# Patient Record
Sex: FEMALE | Race: WHITE | Employment: OTHER | URBAN - METROPOLITAN AREA
[De-identification: names, ages, dates, MRNs, and addresses within clinical notes are randomized per-mention and may not be internally consistent; named-entity substitution may affect disease eponyms.]

---

## 2023-01-13 ENCOUNTER — APPOINTMENT (OUTPATIENT)
Dept: GENERAL RADIOLOGY | Age: 83
DRG: 152 | End: 2023-01-13
Attending: HOSPITALIST
Payer: MEDICARE

## 2023-01-13 ENCOUNTER — APPOINTMENT (OUTPATIENT)
Dept: CT IMAGING | Age: 83
DRG: 152 | End: 2023-01-13
Attending: HOSPITALIST
Payer: MEDICARE

## 2023-01-13 ENCOUNTER — HOSPITAL ENCOUNTER (INPATIENT)
Age: 83
LOS: 5 days | Discharge: HOME | DRG: 152 | End: 2023-01-18
Attending: HOSPITALIST | Admitting: HOSPITALIST
Payer: MEDICARE

## 2023-01-13 DIAGNOSIS — J44.9 CHRONIC OBSTRUCTIVE PULMONARY DISEASE, UNSPECIFIED COPD TYPE (HCC): Primary | ICD-10-CM

## 2023-01-13 PROBLEM — I25.10 CORONARY ARTERY DISEASE INVOLVING NATIVE CORONARY ARTERY OF NATIVE HEART WITHOUT ANGINA PECTORIS: Status: ACTIVE | Noted: 2023-01-13

## 2023-01-13 PROBLEM — J96.01 ACUTE HYPOXEMIC RESPIRATORY FAILURE (HCC): Status: ACTIVE | Noted: 2023-01-13

## 2023-01-13 PROBLEM — J18.9 PNEUMONIA, UNSPECIFIED ORGANISM: Status: ACTIVE | Noted: 2023-01-13

## 2023-01-13 PROBLEM — I63.9 CEREBROVASCULAR ACCIDENT (CVA) (HCC): Status: ACTIVE | Noted: 2023-01-13

## 2023-01-13 LAB
ALBUMIN SERPL-MCNC: 3.2 G/DL (ref 3.4–5)
ALP BLD-CCNC: 124 U/L (ref 40–129)
ALT SERPL-CCNC: 11 U/L (ref 10–40)
ANION GAP SERPL CALCULATED.3IONS-SCNC: 10 MMOL/L (ref 3–16)
AST SERPL-CCNC: 19 U/L (ref 15–37)
BASOPHILS ABSOLUTE: 0 K/UL (ref 0–0.2)
BASOPHILS RELATIVE PERCENT: 0.1 %
BILIRUB SERPL-MCNC: 0.3 MG/DL (ref 0–1)
BILIRUBIN DIRECT: <0.2 MG/DL (ref 0–0.3)
BILIRUBIN, INDIRECT: ABNORMAL MG/DL (ref 0–1)
BUN BLDV-MCNC: 14 MG/DL (ref 7–20)
CALCIUM SERPL-MCNC: 7.9 MG/DL (ref 8.3–10.6)
CHLORIDE BLD-SCNC: 97 MMOL/L (ref 99–110)
CO2: 23 MMOL/L (ref 21–32)
CREAT SERPL-MCNC: 0.7 MG/DL (ref 0.6–1.2)
D DIMER: 1.96 UG/ML FEU (ref 0–0.6)
EKG ATRIAL RATE: 61 BPM
EKG DIAGNOSIS: NORMAL
EKG P AXIS: 27 DEGREES
EKG P-R INTERVAL: 152 MS
EKG Q-T INTERVAL: 408 MS
EKG QRS DURATION: 74 MS
EKG QTC CALCULATION (BAZETT): 410 MS
EKG R AXIS: -40 DEGREES
EKG T AXIS: 5 DEGREES
EKG VENTRICULAR RATE: 61 BPM
EOSINOPHILS ABSOLUTE: 0 K/UL (ref 0–0.6)
EOSINOPHILS RELATIVE PERCENT: 0 %
GFR SERPL CREATININE-BSD FRML MDRD: >60 ML/MIN/{1.73_M2}
GLUCOSE BLD-MCNC: 159 MG/DL (ref 70–99)
HCT VFR BLD CALC: 33.8 % (ref 36–48)
HEMOGLOBIN: 11.7 G/DL (ref 12–16)
LACTIC ACID: 1.1 MMOL/L (ref 0.4–2)
LYMPHOCYTES ABSOLUTE: 0.3 K/UL (ref 1–5.1)
LYMPHOCYTES RELATIVE PERCENT: 4.3 %
MAGNESIUM: 1.8 MG/DL (ref 1.8–2.4)
MCH RBC QN AUTO: 31.1 PG (ref 26–34)
MCHC RBC AUTO-ENTMCNC: 34.6 G/DL (ref 31–36)
MCV RBC AUTO: 89.8 FL (ref 80–100)
MONOCYTES ABSOLUTE: 0.2 K/UL (ref 0–1.3)
MONOCYTES RELATIVE PERCENT: 2.3 %
NEUTROPHILS ABSOLUTE: 6.7 K/UL (ref 1.7–7.7)
NEUTROPHILS RELATIVE PERCENT: 93.3 %
PDW BLD-RTO: 14.5 % (ref 12.4–15.4)
PLATELET # BLD: 118 K/UL (ref 135–450)
PMV BLD AUTO: 8.7 FL (ref 5–10.5)
POTASSIUM REFLEX MAGNESIUM: 3.1 MMOL/L (ref 3.5–5.1)
PROCALCITONIN: 0.12 NG/ML (ref 0–0.15)
RBC # BLD: 3.76 M/UL (ref 4–5.2)
SODIUM BLD-SCNC: 130 MMOL/L (ref 136–145)
TOTAL PROTEIN: 5.5 G/DL (ref 6.4–8.2)
WBC # BLD: 7.2 K/UL (ref 4–11)

## 2023-01-13 PROCEDURE — 83605 ASSAY OF LACTIC ACID: CPT

## 2023-01-13 PROCEDURE — 71275 CT ANGIOGRAPHY CHEST: CPT

## 2023-01-13 PROCEDURE — 93010 ELECTROCARDIOGRAM REPORT: CPT | Performed by: INTERNAL MEDICINE

## 2023-01-13 PROCEDURE — 6370000000 HC RX 637 (ALT 250 FOR IP): Performed by: HOSPITALIST

## 2023-01-13 PROCEDURE — 80048 BASIC METABOLIC PNL TOTAL CA: CPT

## 2023-01-13 PROCEDURE — 6370000000 HC RX 637 (ALT 250 FOR IP)

## 2023-01-13 PROCEDURE — 94761 N-INVAS EAR/PLS OXIMETRY MLT: CPT

## 2023-01-13 PROCEDURE — 83735 ASSAY OF MAGNESIUM: CPT

## 2023-01-13 PROCEDURE — 85379 FIBRIN DEGRADATION QUANT: CPT

## 2023-01-13 PROCEDURE — 99223 1ST HOSP IP/OBS HIGH 75: CPT | Performed by: INTERNAL MEDICINE

## 2023-01-13 PROCEDURE — 1200000000 HC SEMI PRIVATE

## 2023-01-13 PROCEDURE — 36415 COLL VENOUS BLD VENIPUNCTURE: CPT

## 2023-01-13 PROCEDURE — 85025 COMPLETE CBC W/AUTO DIFF WBC: CPT

## 2023-01-13 PROCEDURE — 94640 AIRWAY INHALATION TREATMENT: CPT

## 2023-01-13 PROCEDURE — 2580000003 HC RX 258: Performed by: HOSPITALIST

## 2023-01-13 PROCEDURE — 80076 HEPATIC FUNCTION PANEL: CPT

## 2023-01-13 PROCEDURE — 84145 PROCALCITONIN (PCT): CPT

## 2023-01-13 PROCEDURE — 2700000000 HC OXYGEN THERAPY PER DAY

## 2023-01-13 PROCEDURE — 87449 NOS EACH ORGANISM AG IA: CPT

## 2023-01-13 PROCEDURE — 71045 X-RAY EXAM CHEST 1 VIEW: CPT

## 2023-01-13 PROCEDURE — 6360000002 HC RX W HCPCS: Performed by: HOSPITALIST

## 2023-01-13 PROCEDURE — 93005 ELECTROCARDIOGRAM TRACING: CPT

## 2023-01-13 PROCEDURE — 6360000004 HC RX CONTRAST MEDICATION: Performed by: HOSPITALIST

## 2023-01-13 RX ORDER — NITROGLYCERIN 0.4 MG/1
0.4 TABLET SUBLINGUAL EVERY 5 MIN PRN
COMMUNITY

## 2023-01-13 RX ORDER — POLYETHYLENE GLYCOL 3350 17 G/17G
17 POWDER, FOR SOLUTION ORAL DAILY PRN
Status: DISCONTINUED | OUTPATIENT
Start: 2023-01-13 | End: 2023-01-18 | Stop reason: HOSPADM

## 2023-01-13 RX ORDER — OXYBUTYNIN CHLORIDE 10 MG/1
10 TABLET, EXTENDED RELEASE ORAL DAILY
COMMUNITY
Start: 2022-10-21

## 2023-01-13 RX ORDER — OSELTAMIVIR PHOSPHATE 75 MG/1
75 CAPSULE ORAL 2 TIMES DAILY
Status: DISCONTINUED | OUTPATIENT
Start: 2023-01-13 | End: 2023-01-13 | Stop reason: SDUPTHER

## 2023-01-13 RX ORDER — ALBUTEROL SULFATE 90 UG/1
2 AEROSOL, METERED RESPIRATORY (INHALATION) EVERY 4 HOURS PRN
Status: DISCONTINUED | OUTPATIENT
Start: 2023-01-13 | End: 2023-01-18 | Stop reason: HOSPADM

## 2023-01-13 RX ORDER — IPRATROPIUM BROMIDE AND ALBUTEROL SULFATE 2.5; .5 MG/3ML; MG/3ML
1 SOLUTION RESPIRATORY (INHALATION)
Status: DISCONTINUED | OUTPATIENT
Start: 2023-01-13 | End: 2023-01-13

## 2023-01-13 RX ORDER — OSELTAMIVIR PHOSPHATE 75 MG/1
75 CAPSULE ORAL 2 TIMES DAILY
Status: COMPLETED | OUTPATIENT
Start: 2023-01-13 | End: 2023-01-17

## 2023-01-13 RX ORDER — ENOXAPARIN SODIUM 100 MG/ML
40 INJECTION SUBCUTANEOUS DAILY
Status: DISCONTINUED | OUTPATIENT
Start: 2023-01-13 | End: 2023-01-18 | Stop reason: HOSPADM

## 2023-01-13 RX ORDER — ALBUTEROL SULFATE 90 UG/1
2 AEROSOL, METERED RESPIRATORY (INHALATION) EVERY 6 HOURS PRN
Status: DISCONTINUED | OUTPATIENT
Start: 2023-01-13 | End: 2023-01-13

## 2023-01-13 RX ORDER — ONDANSETRON 4 MG/1
4 TABLET, ORALLY DISINTEGRATING ORAL EVERY 8 HOURS PRN
Status: DISCONTINUED | OUTPATIENT
Start: 2023-01-13 | End: 2023-01-18 | Stop reason: HOSPADM

## 2023-01-13 RX ORDER — METOPROLOL SUCCINATE 50 MG/1
100 TABLET, EXTENDED RELEASE ORAL DAILY
Status: DISCONTINUED | OUTPATIENT
Start: 2023-01-13 | End: 2023-01-18 | Stop reason: HOSPADM

## 2023-01-13 RX ORDER — EZETIMIBE 10 MG/1
10 TABLET ORAL DAILY
Status: DISCONTINUED | OUTPATIENT
Start: 2023-01-13 | End: 2023-01-18 | Stop reason: HOSPADM

## 2023-01-13 RX ORDER — OXYBUTYNIN CHLORIDE 5 MG/1
10 TABLET, EXTENDED RELEASE ORAL DAILY
Status: DISCONTINUED | OUTPATIENT
Start: 2023-01-13 | End: 2023-01-18 | Stop reason: HOSPADM

## 2023-01-13 RX ORDER — FOLIC ACID 1 MG/1
1 TABLET ORAL DAILY
COMMUNITY

## 2023-01-13 RX ORDER — HYDROCHLOROTHIAZIDE 25 MG/1
12.5 TABLET ORAL DAILY
Status: DISCONTINUED | OUTPATIENT
Start: 2023-01-13 | End: 2023-01-18 | Stop reason: HOSPADM

## 2023-01-13 RX ORDER — UBIDECARENONE 100 MG
200 CAPSULE ORAL DAILY
Status: DISCONTINUED | OUTPATIENT
Start: 2023-01-13 | End: 2023-01-13

## 2023-01-13 RX ORDER — LEVOTHYROXINE SODIUM 0.05 MG/1
50 TABLET ORAL DAILY
Status: DISCONTINUED | OUTPATIENT
Start: 2023-01-13 | End: 2023-01-18 | Stop reason: HOSPADM

## 2023-01-13 RX ORDER — ATORVASTATIN CALCIUM 20 MG/1
20 TABLET, FILM COATED ORAL NIGHTLY
COMMUNITY
Start: 2022-10-21

## 2023-01-13 RX ORDER — CILOSTAZOL 100 MG/1
50 TABLET ORAL 2 TIMES DAILY
Status: DISCONTINUED | OUTPATIENT
Start: 2023-01-13 | End: 2023-01-18 | Stop reason: HOSPADM

## 2023-01-13 RX ORDER — LOSARTAN POTASSIUM 100 MG/1
1 TABLET ORAL DAILY
COMMUNITY
Start: 2022-10-21

## 2023-01-13 RX ORDER — POTASSIUM CHLORIDE 7.45 MG/ML
10 INJECTION INTRAVENOUS PRN
Status: DISCONTINUED | OUTPATIENT
Start: 2023-01-13 | End: 2023-01-18 | Stop reason: HOSPADM

## 2023-01-13 RX ORDER — METOPROLOL SUCCINATE 100 MG/1
100 TABLET, EXTENDED RELEASE ORAL DAILY
COMMUNITY
Start: 2022-10-21

## 2023-01-13 RX ORDER — MAGNESIUM SULFATE IN WATER 40 MG/ML
2000 INJECTION, SOLUTION INTRAVENOUS PRN
Status: DISCONTINUED | OUTPATIENT
Start: 2023-01-13 | End: 2023-01-18 | Stop reason: HOSPADM

## 2023-01-13 RX ORDER — EZETIMIBE 10 MG/1
10 TABLET ORAL DAILY
COMMUNITY
Start: 2022-10-21

## 2023-01-13 RX ORDER — ONDANSETRON 2 MG/ML
4 INJECTION INTRAMUSCULAR; INTRAVENOUS EVERY 6 HOURS PRN
Status: DISCONTINUED | OUTPATIENT
Start: 2023-01-13 | End: 2023-01-18 | Stop reason: HOSPADM

## 2023-01-13 RX ORDER — SODIUM CHLORIDE 0.9 % (FLUSH) 0.9 %
5-40 SYRINGE (ML) INJECTION PRN
Status: DISCONTINUED | OUTPATIENT
Start: 2023-01-13 | End: 2023-01-18 | Stop reason: HOSPADM

## 2023-01-13 RX ORDER — LEVOTHYROXINE SODIUM 0.05 MG/1
50 TABLET ORAL DAILY
COMMUNITY
Start: 2022-10-20

## 2023-01-13 RX ORDER — POTASSIUM CHLORIDE 20 MEQ/1
40 TABLET, EXTENDED RELEASE ORAL ONCE
Status: COMPLETED | OUTPATIENT
Start: 2023-01-13 | End: 2023-01-13

## 2023-01-13 RX ORDER — LOSARTAN POTASSIUM 100 MG/1
100 TABLET ORAL DAILY
Status: DISCONTINUED | OUTPATIENT
Start: 2023-01-13 | End: 2023-01-18 | Stop reason: HOSPADM

## 2023-01-13 RX ORDER — ACETAMINOPHEN 325 MG/1
650 TABLET ORAL EVERY 6 HOURS PRN
Status: DISCONTINUED | OUTPATIENT
Start: 2023-01-13 | End: 2023-01-18 | Stop reason: HOSPADM

## 2023-01-13 RX ORDER — FOLIC ACID 1 MG/1
1 TABLET ORAL DAILY
Status: DISCONTINUED | OUTPATIENT
Start: 2023-01-13 | End: 2023-01-18 | Stop reason: HOSPADM

## 2023-01-13 RX ORDER — ALBUTEROL SULFATE 90 UG/1
2 AEROSOL, METERED RESPIRATORY (INHALATION) EVERY 6 HOURS PRN
COMMUNITY

## 2023-01-13 RX ORDER — CILOSTAZOL 50 MG/1
50 TABLET ORAL 2 TIMES DAILY
COMMUNITY
Start: 2022-10-20

## 2023-01-13 RX ORDER — POTASSIUM CHLORIDE 20 MEQ/1
40 TABLET, EXTENDED RELEASE ORAL PRN
Status: DISCONTINUED | OUTPATIENT
Start: 2023-01-13 | End: 2023-01-18 | Stop reason: HOSPADM

## 2023-01-13 RX ORDER — ACETAMINOPHEN 650 MG/1
650 SUPPOSITORY RECTAL EVERY 6 HOURS PRN
Status: DISCONTINUED | OUTPATIENT
Start: 2023-01-13 | End: 2023-01-18 | Stop reason: HOSPADM

## 2023-01-13 RX ORDER — SODIUM CHLORIDE 9 MG/ML
INJECTION, SOLUTION INTRAVENOUS CONTINUOUS
Status: DISCONTINUED | OUTPATIENT
Start: 2023-01-13 | End: 2023-01-13

## 2023-01-13 RX ORDER — HYDROCHLOROTHIAZIDE 12.5 MG/1
12.5 TABLET ORAL DAILY
COMMUNITY
Start: 2022-10-21

## 2023-01-13 RX ORDER — UBIDECARENONE 100 MG
2 CAPSULE ORAL DAILY
COMMUNITY

## 2023-01-13 RX ORDER — SODIUM CHLORIDE 0.9 % (FLUSH) 0.9 %
5-40 SYRINGE (ML) INJECTION EVERY 12 HOURS SCHEDULED
Status: DISCONTINUED | OUTPATIENT
Start: 2023-01-13 | End: 2023-01-18 | Stop reason: HOSPADM

## 2023-01-13 RX ORDER — ATORVASTATIN CALCIUM 10 MG/1
20 TABLET, FILM COATED ORAL NIGHTLY
Status: DISCONTINUED | OUTPATIENT
Start: 2023-01-13 | End: 2023-01-18 | Stop reason: HOSPADM

## 2023-01-13 RX ORDER — SODIUM CHLORIDE 9 MG/ML
INJECTION, SOLUTION INTRAVENOUS PRN
Status: DISCONTINUED | OUTPATIENT
Start: 2023-01-13 | End: 2023-01-18 | Stop reason: HOSPADM

## 2023-01-13 RX ADMIN — LOSARTAN POTASSIUM 100 MG: 100 TABLET, FILM COATED ORAL at 09:48

## 2023-01-13 RX ADMIN — IOPAMIDOL 85 ML: 755 INJECTION, SOLUTION INTRAVENOUS at 17:54

## 2023-01-13 RX ADMIN — SODIUM CHLORIDE 25 ML: 9 INJECTION, SOLUTION INTRAVENOUS at 19:13

## 2023-01-13 RX ADMIN — LEVOTHYROXINE SODIUM 50 MCG: 50 TABLET ORAL at 09:49

## 2023-01-13 RX ADMIN — IPRATROPIUM BROMIDE AND ALBUTEROL 1 PUFF: 20; 100 SPRAY, METERED RESPIRATORY (INHALATION) at 19:59

## 2023-01-13 RX ADMIN — ENOXAPARIN SODIUM 40 MG: 100 INJECTION SUBCUTANEOUS at 09:49

## 2023-01-13 RX ADMIN — HYDROCHLOROTHIAZIDE 12.5 MG: 25 TABLET ORAL at 09:48

## 2023-01-13 RX ADMIN — CEFTRIAXONE SODIUM 1000 MG: 1 INJECTION, POWDER, FOR SOLUTION INTRAMUSCULAR; INTRAVENOUS at 20:23

## 2023-01-13 RX ADMIN — OSELTAMIVIR PHOSPHATE 75 MG: 75 CAPSULE ORAL at 12:22

## 2023-01-13 RX ADMIN — OSELTAMIVIR PHOSPHATE 75 MG: 75 CAPSULE ORAL at 03:04

## 2023-01-13 RX ADMIN — ASPIRIN 325 MG: 325 TABLET, COATED ORAL at 09:48

## 2023-01-13 RX ADMIN — FOLIC ACID 1 MG: 1 TABLET ORAL at 09:48

## 2023-01-13 RX ADMIN — SODIUM CHLORIDE: 9 INJECTION, SOLUTION INTRAVENOUS at 02:58

## 2023-01-13 RX ADMIN — CILOSTAZOL 50 MG: 100 TABLET ORAL at 20:17

## 2023-01-13 RX ADMIN — METOPROLOL SUCCINATE 100 MG: 50 TABLET, EXTENDED RELEASE ORAL at 09:49

## 2023-01-13 RX ADMIN — OSELTAMIVIR PHOSPHATE 75 MG: 75 CAPSULE ORAL at 20:17

## 2023-01-13 RX ADMIN — POTASSIUM CHLORIDE 40 MEQ: 1500 TABLET, EXTENDED RELEASE ORAL at 09:48

## 2023-01-13 RX ADMIN — EZETIMIBE 10 MG: 10 TABLET ORAL at 09:49

## 2023-01-13 RX ADMIN — CILOSTAZOL 50 MG: 100 TABLET ORAL at 13:34

## 2023-01-13 RX ADMIN — AZITHROMYCIN DIHYDRATE 500 MG: 500 INJECTION, POWDER, LYOPHILIZED, FOR SOLUTION INTRAVENOUS at 19:14

## 2023-01-13 RX ADMIN — ATORVASTATIN CALCIUM 20 MG: 10 TABLET, FILM COATED ORAL at 20:17

## 2023-01-13 RX ADMIN — OXYBUTYNIN CHLORIDE 10 MG: 5 TABLET, EXTENDED RELEASE ORAL at 09:49

## 2023-01-13 ASSESSMENT — LIFESTYLE VARIABLES
HOW MANY STANDARD DRINKS CONTAINING ALCOHOL DO YOU HAVE ON A TYPICAL DAY: PATIENT DOES NOT DRINK
HOW OFTEN DO YOU HAVE A DRINK CONTAINING ALCOHOL: NEVER

## 2023-01-13 NOTE — ACP (ADVANCE CARE PLANNING)
Advance Care Planning     General Advance Care Planning (ACP) Conversation    Date of Conversation: 1/12/2023  Conducted with: Patient with Decision Making Capacity    Healthcare Decision Maker:    Primary Decision Maker: [address] [place] [address], Legal Guardian - [phone]  Click here to complete Healthcare Decision Makers including selection of the Healthcare Decision Maker Relationship (ie \"Primary\"). Today we documented Decision Maker(s) consistent with Legal Next of Kin hierarchy. Content/Action Overview:   Has ACP document(s) NOT on file - requested patient to provide  Reviewed DNR/DNI and patient elects Full Code (Attempt Resuscitation)        Length of Voluntary ACP Conversation in minutes:  <16 minutes (Non-Billable)    Guerita Landeros RN

## 2023-01-13 NOTE — H&P
Hospital Medicine History & Physical      PCP: No primary care provider on file. Date of Admission: 1/13/2023    Date of Service: Pt seen/examined on 1/13/2023      Chief Complaint:  No chief complaint on file. History Of Present Illness: The patient is a 78 y.o. female with CAD, PVD, bilateral carotid artery stenosis, HTN, HLD, OAB and hx of CVA with residual left sided weakness who presents [facility] as transfer from [facility] ED with complaint of fall x2. Patient states that for the last 2 weeks she has experienced worsening cough and CASTANEDA. She states she has had pneumonia before and thought that's what it was. She lives alone and fell twice this morning. She states her legs became very weak and she fell to her butt. She called her daughter who took her to her PCP and PCP recommended ED evaluation. Patient has bruise to left buttock but otherwise denies injury or pain from her fall. Denies lightheadedness, dizziness, LOC and did not hit her head. She is not on AC. Denies fevers, chills, chest pain, palpitations, abd pain, n/v/c/d, numbness, tingling or swelling of her extremities. Discussed CODE STATUS with the patient who wishes to remain a FULL CODE. Past Medical History:        Diagnosis Date    CAD (coronary artery disease)     Cerebral artery occlusion with cerebral infarction ([facility]     Hyperlipidemia     Hypertension        Past Surgical History:        Procedure Laterality Date    CARDIAC SURGERY      cardiac cath    HYSTERECTOMY (CERVIX STATUS UNKNOWN)         Medications Prior to Admission:    Prior to Admission medications    Medication Sig Start Date End Date Taking? Authorizing Provider   nitroGLYCERIN (NITROSTAT) 0.4 MG SL tablet Place 0.4 mg under the tongue every 5 minutes as needed for Chest pain up to max of 3 total doses. If no relief after 1 dose, call 911.    Yes Historical Provider, MD   folic acid (FOLVITE) 1 MG tablet Take 1 mg by mouth daily   Yes Historical Provider, MD   albuterol sulfate HFA (VENTOLIN HFA) 108 (90 Base) MCG/ACT inhaler Inhale 2 puffs into the lungs every 6 hours as needed for Wheezing   Yes Historical Provider, MD   vitamin D 25 MCG (1000 UT) CAPS Take 1,000 Units by mouth Twice a Week   Yes Historical Provider, MD   aspirin 325 MG EC tablet Take 325 mg by mouth daily    Historical Provider, MD   atorvastatin (LIPITOR) 20 MG tablet Take 20 mg by mouth nightly 10/21/22   Historical Provider, MD   cilostazol (PLETAL) 50 MG tablet Take 50 mg by mouth in the morning and at bedtime 10/20/22   Historical Provider, MD   coenzyme Q10 100 MG CAPS capsule Take 2 capsules by mouth daily    Historical Provider, MD   ezetimibe (ZETIA) 10 MG tablet Take 10 mg by mouth daily 10/21/22   Historical Provider, MD   hydroCHLOROthiazide (HYDRODIURIL) 12.5 MG tablet Take 12.5 mg by mouth daily 10/21/22   Historical Provider, MD   levothyroxine (SYNTHROID) 50 MCG tablet Take 50 mcg by mouth daily 10/20/22   Historical Provider, MD   losartan (COZAAR) 100 MG tablet Take 1 tablet by mouth daily 10/21/22   Historical Provider, MD   metoprolol succinate (TOPROL XL) 100 MG extended release tablet Take 100 mg by mouth daily 10/21/22   Historical Provider, MD   oxybutynin (DITROPAN-XL) 10 MG extended release tablet Take 10 mg by mouth daily 10/21/22   Historical Provider, MD       Allergies:  Codeine    Social History:  The patient currently lives at home    TOBACCO:   reports that she has quit smoking. Her smoking use included cigarettes. She has a 1.00 pack-year smoking history. She has never used smokeless tobacco.  ETOH:   reports no history of alcohol use. Family History:   Positive as follows:    History reviewed. No pertinent family history.     REVIEW OF SYSTEMS:       Constitutional: Negative for fever +general weakness  HENT: Negative for sore throat   Eyes: Negative for redness   Respiratory: +dyspnea, +cough   Cardiovascular: Negative for chest pain   Gastrointestinal: Negative for vomiting, diarrhea   Genitourinary: Negative for hematuria   Musculoskeletal: Negative for arthralgias   Skin: Negative for rash +bruise to left buttock  Neurological: Negative for syncope   Hematological: Negative for adenopathy   Psychiatric/Behavorial: Negative for anxiety    PHYSICAL EXAM:    BP (!) 173/89   Pulse 78   Temp 97.9 °F (36.6 °C) (Oral)   Resp 18   Ht 5' 7\" (1.702 m)   Wt 157 lb (71.2 kg)   SpO2 92%   BMI 24.59 kg/m²     Gen: No distress. Alert. Eyes: PERRL. No sclera icterus. No conjunctival injection. ENT: No discharge. Pharynx clear. Neck: No JVD. No Carotid Bruit. Trachea midline. Resp: No accessory muscle use. +left sided crackles and wheezes. Bilateral lung sounds are diminished. No rhonchi. CV: Regular rate. Regular rhythm. No murmur. No rub. No edema. Capillary Refill: Brisk,< 3 seconds   Peripheral Pulses: +2 palpable, equal bilaterally   GI: Non-tender. Non-distended. No masses. No organomegaly. Normal bowel sounds. No hernia. Skin: Warm and dry. No nodule on exposed extremities. No rash on exposed extremities. +bruise to left buttock   M/S: No cyanosis. No joint deformity. No clubbing. Neuro: Awake. Grossly nonfocal    Psych: Oriented x 3. No anxiety or agitation. Lawrence Landry MD have reviewed the chart on [facility] and personally interviewed and examined patient, reviewed the data (labs and imaging) and after discussion with my PA formulated the plan. Agree with note with the following edits. HPI: 78-year-old female the history of coronary disease, fibromuscular disease, hypertension and history of CVA with residual left-sided weakness presented to [facility] emergency room with complaints of fall x2. She was subsequently transferred here. She has been having a cough with green-colored sputum along with mild shortness of breath.   Denies any fever chills  Found to have pneumonia on chest x-ray at [facility]    I reviewed the patient's Past Medical History, Past Surgical History, Medications, and Allergies. Physical exam:    BP (!) 173/89   Pulse 78   Temp 97.9 °F (36.6 °C) (Oral)   Resp 18   Ht 5' 7\" (1.702 m)   Wt 157 lb (71.2 kg)   SpO2 92%   BMI 24.59 kg/m²     Gen: No distress. Alert. Eyes: PERRL. No sclera icterus. No conjunctival injection. ENT: No discharge. Pharynx clear. Neck: Trachea midline. Normal thyroid. Resp: No accessory muscle use. No crackles. No wheezes. No rhonchi. No dullness on percussion. CV: Regular rate. Regular rhythm. No murmur or rub. No edema. GI: Non-tender. Non-distended. No masses. No organomegaly. Normal bowel sounds. No hernia. Skin: Warm and dry. No nodule on exposed extremities. No rash on exposed extremities. Lymph: No cervical LAD. No supraclavicular LAD. M/S: No cyanosis. No joint deformity. No clubbing. Neuro: Awake. Moves all 4 extremities, non focal  Psych: Oriented x 3. No anxiety or agitation.            LAURI Mora.     CBC:   Recent Labs     01/13/23  0243   WBC 7.2   HGB 11.7*   HCT 33.8*   MCV 89.8   *     BMP:   Recent Labs     01/13/23  0243   *   K 3.1*   CL 97*   CO2 23   BUN 14   CREATININE 0.7     LIVER PROFILE:   Recent Labs     01/13/23  0243   AST 19   ALT 11   BILIDIR <0.2   BILITOT 0.3   ALKPHOS 80       CULTURES  Flu reported to be positive; awaiting paperwork from [facility] ED  Resp cx pending  Strep and legionella pending    EKG:  I have reviewed the EKG with the following interpretation:   Pending    RADIOLOGY  XR CHEST PORTABLE    (Results Pending)        ASSESSMENT/PLAN:  #Acute respiratory failure with hypoxia  -no home O2 at baseline  -requiring 3.5 L at this time  -no history of asthma or COPD  Exsmoker   -dayne IBD  -wean O2 as tolerated    -Patient is reported to have pneumonia and influenza although I do not have records or imaging from ED  -check CXR-atelectasis versus pneumonia  -check procal-negative  -check resp cx, strep and legionella  -treat for gram-positive pneumonia with zithromax and rocephin   -treat influenza with tamiflu  -droplet isolation    #Fall x2   #Generalized weakness  -no acute injury other than bruise on right buttock  -EKG pending  -PTOT    #Hx of CVA with residual left sided weakness  -supportive care  -uses rollator at home and hemiwalker in public at baseline  -continue ASA and statin    #HypoK  -replace    #Thrombocytopenia  -mild  -monitor on lovenox    #CAD  #PVD  #Bilateral carotid artery stenosis  -continue pletal, ASA and statin    #HTN   -BP elevated  -continue home toprol xl, cozaar and hctz    #HLD  -continue statin and zetia    #Hypothyroidism  -continue synthroid    #OAB  -continue oxybutynin     DVT Prophylaxis: Lovenox  Diet: ADULT DIET; Regular  Code Status: Full Code    Tim King PA-C  1/13/2023 9:38 AM      Agree with above  KAREN Mora

## 2023-01-13 NOTE — PROGRESS NOTES
Transferred care to [facility]. Face to face report given, no need voiced at this time.

## 2023-01-13 NOTE — PROGRESS NOTES
Patient admitted to room 212 from [facility]. Alert and Oriented. With 4 lpm oxygen via nasal cannula. Side rails up x2. Patient has an order for telemetry. Bed is locked and in lowest position. Call light placed in patient reach. Patient explained the routine of the hospital, including but not limited to lab work, vital signs, hourly rounding, etc. Care plans and education updated, CHG wipes done at time of admission. BP (!) 160/82   Pulse 78   Temp 97.2 °F (36.2 °C) (Oral)   Resp 20   Ht 5' 7\" (1.702 m)   Wt 157 lb (71.2 kg)   SpO2 95%   BMI 24.59 kg/m²     Most recent set of vitals as shown. Patient has no  LDA that require CHG wipes, including possible a surgery incision, vazquez catheter, or a central line. 4 Eyes Skin Assessment     The patient is being assess for   Admission    I agree that 2 RN's have performed a thorough Head to Toe Skin Assessment on the patient. ALL assessment sites listed below have been assessed. Areas assessed for pressure by both nurses:   [x]   Head, Face, and Ears   [x]   Shoulders, Back, and Chest, Abdomen  [x]   Arms, Elbows, and Hands   [x]   Coccyx, Sacrum, and Ischium  [x]   Legs, Feet, and Heels    Ecchymosis noted over the left buttock. Blanchable redness over the sacrum, left heel. Scattered bruising over the bilateral upper and lower limbs. Healing laceration over the left posterior thigh. Skin Assessed Under all Medical Devices by both nurses:  O2 device tubing              All Mepilex Borders were peeled back and area peeked at by both nurses:  No:    Please list where Mepilex Borders are located:  None             **SHARE this note so that the co-signing nurse is able to place an eSignature**    Co-signer eSignature: Electronically signed by Donald Oliver RN on 1/13/23 at 4:54 AM EST    Does the Patient have Skin Breakdown related to pressure?   No              Manny Prevention initiated:  No   Wound Care Orders initiated:  No      [facility] nurse consulted for Pressure Injury (Stage 3,4, Unstageable, DTI, NWPT, Complex wounds)and New or Established Ostomies:  No      Primary Nurse eSignature: Electronically signed by Kerri Soria RN on 1/13/23 at 4:03 AM EST      Bedside Mobility Assessment Tool (BMAT):     Assessment Level 1- Sit and Shake    1. From a semi-reclined position, ask patient to sit up and rotate to a seated position at the side of the bed. Can use the bedrail. 2. Ask patient to reach out and grab your hand and shake making sure patient reaches across his/her midline. Pass- Patient is able to come to a seated position, maintain core strength. Maintains seated balance while reaching across midline. Move on to Assessment Level 2. Assessment Level 2- Stretch and Point   1. With patient in seated position at the side of the bed, have patient place both feet on the floor (or stool) with knees no higher than hips. 2. Ask patient to stretch one leg and straighten the knee, then bend the ankle/flex and point the toes. If appropriate, repeat with the other leg. Pass- Patient is able to demonstrate appropriate quad strength on intended weight bearing limb(s). Move onto Assessment Level 3. Assessment Level 3- Stand   1. Ask patient to elevate off the bed or chair (seated to standing) using an assistive device (cane, bedrail). 2. Patient should be able to raise buttocks off be and hold for a count of five. May repeat once. Fail- Patient unable to demonstrate standing stability. Patient is MOBILITY LEVEL 3. Assessment Level 4- Walk   1. Ask patient to march in place at bedside. 2. Then ask patient to advance step and return each foot. Some medical conditions may render a patient from stepping backwards, use your best clinical judgement. Fail- Patient not able to complete tasks OR requires use of assistive device. Patient is MOBILITY LEVEL 3.        Mobility Level- 2    Patient is able to demonstrate the ability to move from a reclining position to an upright position within the recliner.

## 2023-01-13 NOTE — CONSULTS
Pharmacy to contact day rounding physician in am once home med list is complete and ready for reconciliation. Med Rec status noted as completed by MELISSA Garces, at 09:16, 01/13/2023. Consult completed.   DEONDRE Valenzuela.Ph.1/13/202310:40 AM

## 2023-01-13 NOTE — PROGRESS NOTES
Patient resting in bed awake, no s/s of distress noted. No needs voiced at this time, Call light within reach.

## 2023-01-13 NOTE — PROGRESS NOTES
Pt. admitted to [facility]: Yes from [facility]    Present evaluation shows pt. is Alert and Oriented x4    Telemetry shows Normal Sinus Rhythm rate 80    VS   ED Triage Vitals [01/13/23 0200]   Enc Vitals Group      BP (!) 160/82      Heart Rate 78      Resp 20      Temp 97.2 °F (36.2 °C)      Temp Source Oral      SpO2 95 %      Weight 157 lb (71.2 kg)      Height 5' 7\" (1.702 m)      Head Circumference       Peak Flow       Pain Score       Pain Loc       Pain Edu? Excl. in [address]?          O2 at 4 lpm    Skin:   Dry and Warm    Respiration:   regular, no distress    Distress noted  no    Agree with unit placement of [address] [facility] at 1/13/2023    Electronically Signed by: Electronically signed by Jj Velasquez RN on 1/13/23 at 3:16 AM EST

## 2023-01-13 NOTE — PLAN OF CARE
Problem: Discharge Planning  Goal: Discharge to home or other facility with appropriate resources  1/13/2023 0408 by Rudy Spurling, RN  Outcome: Progressing     Problem: Safety - Adult  Goal: Free from fall injury  1/13/2023 0408 by Rudy Spurling, RN  Outcome: Progressing     Problem: Skin/Tissue Integrity  Goal: Absence of new skin breakdown  Description: 1. Monitor for areas of redness and/or skin breakdown  2. Assess vascular access sites hourly  3. Every 4-6 hours minimum:  Change oxygen saturation probe site  4. Every 4-6 hours:  If on nasal continuous positive airway pressure, respiratory therapy assess nares and determine need for appliance change or resting period.   1/13/2023 0408 by Rudy Spurling, RN  Outcome: Progressing     Problem: Pain  Goal: Verbalizes/displays adequate comfort level or baseline comfort level  Outcome: Progressing

## 2023-01-13 NOTE — PROGRESS NOTES
RT Inhaler-Nebulizer Bronchodilator Protocol Note    There is a bronchodilator order in the chart from a provider indicating to follow the RT Bronchodilator Protocol and there is an Initiate RT Inhaler-Nebulizer Bronchodilator Protocol order as well (see protocol at bottom of note). CXR Findings:  XR CHEST PORTABLE    Result Date: 1/13/2023  Mild bibasilar airspace opacities favored to represent atelectasis. The findings from the last RT Protocol Assessment were as follows:   History Pulmonary Disease: (P) Smoker 15 pack years or more  Respiratory Pattern: (P) Regular pattern and RR 12-20 bpm  Breath Sounds: (P) Intermittent or unilateral wheezes  Cough: (P) Strong, productive  Indication for Bronchodilator Therapy: (P) Wheezing associated with pulm disorder  Bronchodilator Assessment Score: (P) 6    Aerosolized bronchodilator medication orders have been revised according to the RT Inhaler-Nebulizer Bronchodilator Protocol below. Respiratory Therapist to perform RT Therapy Protocol Assessment initially then follow the protocol. Repeat RT Therapy Protocol Assessment PRN for score 0-3 or on second treatment, BID, and PRN for scores above 3. No Indications - adjust the frequency to every 6 hours PRN wheezing or bronchospasm, if no treatments needed after 48 hours then discontinue using Per Protocol order mode. If indication present, adjust the RT bronchodilator orders based on the Bronchodilator Assessment Score as indicated below. Use Inhaler orders unless patient has one or more of the following: on home nebulizer, not able to hold breath for 10 seconds, is not alert and oriented, cannot activate and use MDI correctly, or respiratory rate 25 breaths per minute or more, then use the equivalent nebulizer order(s) with same Frequency and PRN reasons based on the score. If a patient is on this medication at home then do not decrease Frequency below that used at home.     0-3 - enter or revise RT bronchodilator order(s) to equivalent RT Bronchodilator order with Frequency of every 4 hours PRN for wheezing or increased work of breathing using Per Protocol order mode. 4-6 - enter or revise RT Bronchodilator order(s) to two equivalent RT bronchodilator orders with one order with BID Frequency and one order with Frequency of every 4 hours PRN wheezing or increased work of breathing using Per Protocol order mode. 7-10 - enter or revise RT Bronchodilator order(s) to two equivalent RT bronchodilator orders with one order with TID Frequency and one order with Frequency of every 4 hours PRN wheezing or increased work of breathing using Per Protocol order mode. 11-13 - enter or revise RT Bronchodilator order(s) to one equivalent RT bronchodilator order with QID Frequency and an Albuterol order with Frequency of every 4 hours PRN wheezing or increased work of breathing using Per Protocol order mode. Greater than 13 - enter or revise RT Bronchodilator order(s) to one equivalent RT bronchodilator order with every 4 hours Frequency and an Albuterol order with Frequency of every 2 hours PRN wheezing or increased work of breathing using Per Protocol order mode.          Electronically signed by Mary Garcia RCP on 1/13/2023 at 11:19 AM

## 2023-01-13 NOTE — CARE COORDINATION
Case Management Assessment  Initial Evaluation      Patient Name: Adama Pan  YOB: 1940  Diagnosis: [facility] [J96.01]  Pneumonia, unspecified organism [J18.9]  Date / Time: 1/13/2023  1:57 AM    Admission status/Date:1/13/23 inpt  Chart Reviewed: Yes      Patient Interviewed: Yes   Family Interviewed:  No      Hospitalization in the last 30 days:  No      Health Care Decision Maker :   Primary Decision Maker: [address] [place] [address], Legal Guardian - [phone]    (CM - must 1st enter selection under Navigator - emergency contact- [place] Relationship and pick relationship)   Who do you trust or have selected to make healthcare decisions for you      Met with: patient  Interview conducted  (bedside/phone):    Current PCP: [facility] required for SNF :  Y         3 night stay required -  [employer]  Support Systems/Care Needs: Children  Transportation: self family   Meal Preparation: Self    Housing  Living Arrangements: lives 1 story home  Steps: ramp  Intent for return to present living arrangements: Yes  Identified Issues: [address] with Home Health Care : No Agency:(Services)  Type of Home Care Services: None  Passport/Waiver : No  :                      Phone Number:    Passport/Waiver Services: N/A          Durable Medical Equiptment   DME Provider:   Equipment:   Quoc Rom and rolliator___Cane___RTS___ BSC_x__Shower Chair__x_Hospital Bed___W/C____Other________  02 at ____Liter(s)---wears(frequency)_______ [facility] ___ CPAP___ BiPap___   N/A____      Home O2 Use :  No    If No for home O2---if presently on O2 during hospitalization:  No  if yes CM to follow for potential DC O2 need  Informed of need for care provider to bring portable home O2 tank on day of discharge for nursing to connect prior to leaving:   Not Indicated  Verbalized agreement/Understanding:   Not Indicated    Community Service Affiliation  Dialysis:  No    Agency:  Location:  Dialysis Schedule:  Phone:   Fax: Other Community Services: (ex:PT/OT,Mental Health,Wound Clinic, Cardio/Pul [address]) None    DISCHARGE PLAN: Explained Case Management role/services. Reviewed chart and met with pt at bedside. Role of CM explained. States lives home alone and plan return. States rainer assist and if needed at dc rainer will stay 24/7 with her. [facility] has been IPTA with all care. Discussed HHC and pt is agreeable to [facility] if needed and would like [facility]. Will cont tp follow for HHC/O2 needs.

## 2023-01-14 PROBLEM — J44.1 COPD WITH ACUTE EXACERBATION (HCC): Status: ACTIVE | Noted: 2023-01-14

## 2023-01-14 PROBLEM — E87.1 HYPONATREMIA: Status: ACTIVE | Noted: 2023-01-14

## 2023-01-14 LAB
ANION GAP SERPL CALCULATED.3IONS-SCNC: 8 MMOL/L (ref 3–16)
BASOPHILS ABSOLUTE: 0 K/UL (ref 0–0.2)
BASOPHILS RELATIVE PERCENT: 0.1 %
BUN BLDV-MCNC: 15 MG/DL (ref 7–20)
CALCIUM SERPL-MCNC: 7.9 MG/DL (ref 8.3–10.6)
CHLORIDE BLD-SCNC: 95 MMOL/L (ref 99–110)
CO2: 24 MMOL/L (ref 21–32)
CREAT SERPL-MCNC: 0.7 MG/DL (ref 0.6–1.2)
EOSINOPHILS ABSOLUTE: 0 K/UL (ref 0–0.6)
EOSINOPHILS RELATIVE PERCENT: 0 %
GFR SERPL CREATININE-BSD FRML MDRD: >60 ML/MIN/{1.73_M2}
GLUCOSE BLD-MCNC: 167 MG/DL (ref 70–99)
GLUCOSE BLD-MCNC: 99 MG/DL (ref 70–99)
HCT VFR BLD CALC: 35.1 % (ref 36–48)
HEMOGLOBIN: 11.7 G/DL (ref 12–16)
L. PNEUMOPHILA SEROGP 1 UR AG: NORMAL
LYMPHOCYTES ABSOLUTE: 1.3 K/UL (ref 1–5.1)
LYMPHOCYTES RELATIVE PERCENT: 14.2 %
MAGNESIUM: 2 MG/DL (ref 1.8–2.4)
MCH RBC QN AUTO: 30.3 PG (ref 26–34)
MCHC RBC AUTO-ENTMCNC: 33.4 G/DL (ref 31–36)
MCV RBC AUTO: 90.7 FL (ref 80–100)
MONOCYTES ABSOLUTE: 0.5 K/UL (ref 0–1.3)
MONOCYTES RELATIVE PERCENT: 5.4 %
NEUTROPHILS ABSOLUTE: 7.1 K/UL (ref 1.7–7.7)
NEUTROPHILS RELATIVE PERCENT: 80.3 %
PDW BLD-RTO: 15.4 % (ref 12.4–15.4)
PERFORMED ON: ABNORMAL
PLATELET # BLD: 128 K/UL (ref 135–450)
PMV BLD AUTO: 8.5 FL (ref 5–10.5)
POTASSIUM REFLEX MAGNESIUM: 3.4 MMOL/L (ref 3.5–5.1)
RBC # BLD: 3.87 M/UL (ref 4–5.2)
SODIUM BLD-SCNC: 127 MMOL/L (ref 136–145)
STREP PNEUMONIAE ANTIGEN, URINE: NORMAL
WBC # BLD: 8.9 K/UL (ref 4–11)

## 2023-01-14 PROCEDURE — 97116 GAIT TRAINING THERAPY: CPT

## 2023-01-14 PROCEDURE — 36415 COLL VENOUS BLD VENIPUNCTURE: CPT

## 2023-01-14 PROCEDURE — 97530 THERAPEUTIC ACTIVITIES: CPT

## 2023-01-14 PROCEDURE — 6370000000 HC RX 637 (ALT 250 FOR IP)

## 2023-01-14 PROCEDURE — 2700000000 HC OXYGEN THERAPY PER DAY

## 2023-01-14 PROCEDURE — 6370000000 HC RX 637 (ALT 250 FOR IP): Performed by: INTERNAL MEDICINE

## 2023-01-14 PROCEDURE — 2580000003 HC RX 258: Performed by: HOSPITALIST

## 2023-01-14 PROCEDURE — 6360000002 HC RX W HCPCS: Performed by: HOSPITALIST

## 2023-01-14 PROCEDURE — 80048 BASIC METABOLIC PNL TOTAL CA: CPT

## 2023-01-14 PROCEDURE — 94761 N-INVAS EAR/PLS OXIMETRY MLT: CPT

## 2023-01-14 PROCEDURE — 97535 SELF CARE MNGMENT TRAINING: CPT

## 2023-01-14 PROCEDURE — 6370000000 HC RX 637 (ALT 250 FOR IP): Performed by: HOSPITALIST

## 2023-01-14 PROCEDURE — 1200000000 HC SEMI PRIVATE

## 2023-01-14 PROCEDURE — 85025 COMPLETE CBC W/AUTO DIFF WBC: CPT

## 2023-01-14 PROCEDURE — 97162 PT EVAL MOD COMPLEX 30 MIN: CPT

## 2023-01-14 PROCEDURE — 83735 ASSAY OF MAGNESIUM: CPT

## 2023-01-14 PROCEDURE — 94640 AIRWAY INHALATION TREATMENT: CPT

## 2023-01-14 PROCEDURE — 97166 OT EVAL MOD COMPLEX 45 MIN: CPT

## 2023-01-14 PROCEDURE — 99233 SBSQ HOSP IP/OBS HIGH 50: CPT | Performed by: INTERNAL MEDICINE

## 2023-01-14 RX ORDER — IPRATROPIUM BROMIDE AND ALBUTEROL SULFATE 2.5; .5 MG/3ML; MG/3ML
1 SOLUTION RESPIRATORY (INHALATION) 3 TIMES DAILY
Status: DISCONTINUED | OUTPATIENT
Start: 2023-01-14 | End: 2023-01-18 | Stop reason: HOSPADM

## 2023-01-14 RX ORDER — PREDNISONE 20 MG/1
40 TABLET ORAL DAILY
Status: DISCONTINUED | OUTPATIENT
Start: 2023-01-14 | End: 2023-01-18 | Stop reason: HOSPADM

## 2023-01-14 RX ORDER — IPRATROPIUM BROMIDE AND ALBUTEROL SULFATE 2.5; .5 MG/3ML; MG/3ML
1 SOLUTION RESPIRATORY (INHALATION)
Status: DISCONTINUED | OUTPATIENT
Start: 2023-01-14 | End: 2023-01-14

## 2023-01-14 RX ORDER — AZITHROMYCIN 250 MG/1
250 TABLET, FILM COATED ORAL DAILY
Status: DISCONTINUED | OUTPATIENT
Start: 2023-01-14 | End: 2023-01-18

## 2023-01-14 RX ADMIN — CILOSTAZOL 50 MG: 100 TABLET ORAL at 21:06

## 2023-01-14 RX ADMIN — ATORVASTATIN CALCIUM 20 MG: 10 TABLET, FILM COATED ORAL at 20:52

## 2023-01-14 RX ADMIN — LOSARTAN POTASSIUM 100 MG: 100 TABLET, FILM COATED ORAL at 10:12

## 2023-01-14 RX ADMIN — IPRATROPIUM BROMIDE AND ALBUTEROL SULFATE 1 AMPULE: 2.5; .5 SOLUTION RESPIRATORY (INHALATION) at 19:09

## 2023-01-14 RX ADMIN — AZITHROMYCIN MONOHYDRATE 250 MG: 250 TABLET ORAL at 10:12

## 2023-01-14 RX ADMIN — ASPIRIN 325 MG: 325 TABLET, COATED ORAL at 10:12

## 2023-01-14 RX ADMIN — LEVOTHYROXINE SODIUM 50 MCG: 50 TABLET ORAL at 10:12

## 2023-01-14 RX ADMIN — OSELTAMIVIR PHOSPHATE 75 MG: 75 CAPSULE ORAL at 10:13

## 2023-01-14 RX ADMIN — OXYBUTYNIN CHLORIDE 10 MG: 5 TABLET, EXTENDED RELEASE ORAL at 10:12

## 2023-01-14 RX ADMIN — CILOSTAZOL 50 MG: 100 TABLET ORAL at 10:16

## 2023-01-14 RX ADMIN — FOLIC ACID 1 MG: 1 TABLET ORAL at 10:12

## 2023-01-14 RX ADMIN — SODIUM CHLORIDE, PRESERVATIVE FREE 10 ML: 5 INJECTION INTRAVENOUS at 20:56

## 2023-01-14 RX ADMIN — METOPROLOL SUCCINATE 100 MG: 50 TABLET, EXTENDED RELEASE ORAL at 10:12

## 2023-01-14 RX ADMIN — OSELTAMIVIR PHOSPHATE 75 MG: 75 CAPSULE ORAL at 20:54

## 2023-01-14 RX ADMIN — SODIUM CHLORIDE, PRESERVATIVE FREE 10 ML: 5 INJECTION INTRAVENOUS at 10:28

## 2023-01-14 RX ADMIN — IPRATROPIUM BROMIDE AND ALBUTEROL SULFATE 1 AMPULE: 2.5; .5 SOLUTION RESPIRATORY (INHALATION) at 11:30

## 2023-01-14 RX ADMIN — HYDROCHLOROTHIAZIDE 12.5 MG: 25 TABLET ORAL at 10:12

## 2023-01-14 RX ADMIN — PREDNISONE 40 MG: 20 TABLET ORAL at 10:12

## 2023-01-14 RX ADMIN — EZETIMIBE 10 MG: 10 TABLET ORAL at 10:12

## 2023-01-14 RX ADMIN — ENOXAPARIN SODIUM 40 MG: 100 INJECTION SUBCUTANEOUS at 10:27

## 2023-01-14 RX ADMIN — POTASSIUM CHLORIDE 40 MEQ: 1500 TABLET, EXTENDED RELEASE ORAL at 15:00

## 2023-01-14 NOTE — PROGRESS NOTES
Inpatient Physical Therapy Evaluation and Treatment    Unit: [facility]  Date:  1/14/2023  Patient Name:    Torsten Marrero  Admitting diagnosis:  Acute hypoxemic respiratory failure ([facility] [address] [J96.01]  Pneumonia, unspecified organism [J18.9]  Admit Date:  1/13/2023  Precautions/Restrictions/WB Status/ Lines/ Wounds/ Oxygen: Fall risk, Bed/chair alarm, Lines -IV, Supplemental O2 (4 L), and Purewick catheter, Telemetry, Continuous pulse oximetry, and Isolation Precautions: Droplet, L AFO and sneakers with ambulation  Watch BP    Patient cleared by RN for all activity this date  Treatment Time:  8:40-9:43  Treatment Number:  1   Timed Code Treatment Minutes: 53 minutes  Total Treatment Minutes:  63 minutes    Patient Goals for Therapy: \" none stated \"          Discharge Recommendations:  home with initail 24/7 supervision and home PT  DME needs for discharge: Needs Met       Therapy recommendation for EMS Transport: NA    Therapy recommendations for staff:   Assist of 1 with use of rolling walker (RW) and gait belt for all transfers to/from [facility]  to/from [facility]    History Of Present Illness:     per H&P:  \"The patient is a 78 y.o. female with CAD, PVD, bilateral carotid artery stenosis, HTN, HLD, OAB and hx of CVA with residual left sided weakness who presents [facility] as transfer from [facility] ED with complaint of fall x2. Patient states that for the last 2 weeks she has experienced worsening cough and CASTANEDA. She states she has had pneumonia before and thought that's what it was. She lives alone and fell twice this morning. She states her legs became very weak and she fell to her butt. She called her daughter who took her to her PCP and PCP recommended ED evaluation. Patient has bruise to left buttock but otherwise denies injury or pain from her fall. Denies lightheadedness, dizziness, LOC and did not hit her head. She is not on AC.    Denies fevers, chills, chest pain, palpitations, abd pain, n/v/c/d, numbness, tingling or swelling of her extremities. Discussed CODE STATUS with the patient who wishes to remain a FULL CODE. \"   Past Medical History:    Past Medical History []Expand by Default            Diagnosis Date    CAD (coronary artery disease)      Cerebral artery occlusion with cerebral infarction ([facility] [address]      Hyperlipidemia      Hypertension         Chest CT:  Impression:        1. No evidence of acute pulmonary embolism or acute aortic disease. No   evidence of aortic aneurysm or dissection. 2. Heart, pericardium and mediastinum appear stable. No evidence of   lymphadenopathy. 3. Moderate emphysema. Bilateral lower lobe atelectatic changes but no   consolidative infiltrates. Increased interstitial markings may indicate mild   interstitial edema. 4. Incidental cholelithiasis without inflammation of the bile duct. 5. Incidental bilateral adrenal nodules. Home Health S4 Level Recommendation:  Level 1 Standard  AM-PAC Mobility Score    AM-PAC Inpatient Mobility Raw Score : 18     Sydni Ibanez scored a 18/24 on the AM-PAC short mobility form. Current research shows that an AM-PAC score of 18 or greater is typically associated with a discharge to the patient's home setting. If patient discharges prior to next session this note will serve as a discharge summary. Please see below for the latest assessment towards goals. Preadmission Environment    Pt. Lives Alone- dtr works full time (6:30-3) and is unable to provide 24/hr assistance. Home environment:    one story home  Steps to enter first floor:   ramp            Steps to second floor: N/A  Laundry:          Main level  Bathroom:       Tub/Shower unit, Tub Transfer Bench, Raised toilet seat w/o arms, and BSC (3 in 1 commode)- by bed.   Sleeps in a:     Flat bed with hand rail  Equipment owned:      Rollator , rashaad-walker, manual W/C, lift chair, pulse ox, and reacher      If pt returns home with dtr, pt will have a level entry and be able to live on one level. Preadmission Status / PLOF:  History of falls             Yes - \"Legs just got weak and gave out\"  Pt. Able to drive          Yes  Pt Fully independent with ADL's         Yes  Pt. Required assistance from family for:  Cleaning  and shopping  Pt. Fully independent for transfers and gait and walked with: Rollator and in the community uses rashaad walker. Electric cart at L3. Pain   No    Cognition    A&O x4   Able to follow 2 step commands    Subjective  Patient lying supine in bed with  daughter present . Pt agreeable to this PT eval & tx. Upper Extremity ROM/Strength  Please see OT evaluation. Lower Extremity ROM / Strength   AROM WFL: No  ROM limitations: foot drop L foot  Strength WFL to complete bed mobility,transfers and short distance ambulation     Lower Extremity Sensation    Impaired LLE    Lower Extremity Proprioception:   WFL    Coordination and Tone  WFL    Balance  Sitting:  Normal; Independent  Comments: donned L AFO and B shoes independently with increased time to complete    Standing: Good ; SBA  Comments: using RW, no dizziness    Bed Mobility   Supine to Sit:    SBA,HOB flat, used handrail(has one at home)  Sit to Supine:   Not Tested  Rolling:   Not Tested  Scooting in sitting: Independent  Scooting in supine:  Not Tested    Transfer Training     Sit to stand:   CGA  Stand to sit:   CGA  Bed to Chair:   CGA with use of gait belt and rolling walker (RW)    Gait gait completed as indicated below  Distance:      10 ft  Deviations (firm surface/linoleum):  decreased akua, forward flexed posture, and step to pattern  Assistive Device Used:    gait belt and rolling walker (RW)  Level of Assist:    CGA  Comment: steady,no LOB,no dizziness    Stair Training deferred, pt does not have stairs in home environment  Activity Tolerance      Pt tolerated functional transfer to the chair with a decrease in BP.  RN and MD made aware, as patient has not yet received medication for BP. Pt reports no symptoms with drop in BP.        BP (mmHg)     HR (bpm) SpO2 (%)   Semifowlers before activity  164/86 71 bpm 92% on 4L   Seated EOB             147/83, a little dizzy,resolved completely  133/80- After ~ 5 minutes  134/79- after ~10 minutes 82 bpm     80 bpm 95% on 4L     97% on 4 L   Seated after activity-donning shoes, bending , prolonged time to complete 115/74,no symptoms       Post activity 120/69  94/65 - After ~ 5 minutes  113/65 - After ~10 minutes         Positioning Needs:   Reclined in chair with call light and needs in reach. Alarm Set    Other  RN and PCA aware of level of assist  RN to recheck vitals in ~15 minutes  MD aware of BP response    Patient/Family Education   Pt educated on role of inpatient PT, POC, importance of continued activity, DC recommendations, safety awareness, transfer techniques, and calling for assist with mobility. Assessment  Pt seen for Physical Therapy evaluation in acute care setting. Pt demonstrated decreased Activity tolerance and Safety as well as decreased independence with Ambulation, Bed Mobility , and Transfers. Patient will benefit from skilled PT to maximize functional mobility while in the [facility] care setting. Patient will benefit from close monitoring of vital signs, especially BP so medical staff can adjust medication. Recommending home with initial 24/7 supervision if available, and home PT. Goals : To be met in 3 visits:  1). Independent with LE Ex x 10 reps  2.) Bed to chair: Independent    To be met in 6 visits:  1). Supine to/from sit: Independent  2). Sit to/from stand: Independent  3). Bed to chair: Independent  4). Gait: Ambulate 150 ft.  with  SBA and use of rolling walker (RW)  5).   Tolerate B LE exercises 3 sets of 10-15 reps      Rehabilitation Potential: Good  Strengths for achieving goals include:   Pt motivated, PLOF, Family Support, and Pt cooperative   Barriers to achieving goals include:    Complexity of condition and Weakness    Plan    To be seen 3-5 x / week  while in acute care setting for therapeutic exercises, bed mobility, transfers, progressive gait training, balance training, and family/patient education. Signature: nAkush Fang, PT #[id number]     If patient discharges from this facility prior to next visit, this note will serve as the Discharge Summary.

## 2023-01-14 NOTE — PROGRESS NOTES
Admit: 2023    Name:  Lori Feldman  Room:  [address] [place] [address]  MRN:    [id number]    Critical Care Daily Progress Note for 2023     Interval History:     Scheduled Meds:   azithromycin  250 mg Oral Daily    predniSONE  40 mg Oral Daily    sodium chloride flush  5-40 mL IntraVENous 2 times per day    enoxaparin  40 mg SubCUTAneous Daily    aspirin  325 mg Oral Daily    atorvastatin  20 mg Oral Nightly    cilostazol  50 mg Oral BID    ezetimibe  10 mg Oral Daily    folic acid  1 mg Oral Daily    hydroCHLOROthiazide  12.5 mg Oral Daily    levothyroxine  50 mcg Oral Daily    losartan  100 mg Oral Daily    metoprolol succinate  100 mg Oral Daily    oxybutynin  10 mg Oral Daily    oseltamivir  75 mg Oral BID    albuterol-ipratropium  1 puff Inhalation 4x daily       Continuous Infusions:   sodium chloride Stopped (23)       PRN Meds:  sodium chloride flush, sodium chloride, ondansetron **OR** ondansetron, polyethylene glycol, acetaminophen **OR** acetaminophen, potassium chloride **OR** potassium alternative oral replacement **OR** potassium chloride, magnesium sulfate, albuterol sulfate HFA                  Objective:     Temp  Av.4 °F (36.3 °C)  Min: 96.8 °F (36 °C)  Max: 97.9 °F (36.6 °C)  Pulse  Av.8  Min: 65  Max: 92  BP  Min: 132/82  Max: 183/89  SpO2  Av %  Min: 92 %  Max: 94 %  Patient Vitals for the past 4 hrs:   Resp SpO2   23 0809 18 92 %         Intake/Output Summary (Last 24 hours) at 2023 6020  Last data filed at 2023  Gross per 24 hour   Intake 694.9 ml   Output 550 ml   Net 144.9 ml       Physical Exam:  General:  Awake, alert and oriented.  Appears to be not in any distress  Mucous Membranes:  Pink , anicteric  Neck: No JVD, no carotid bruit, no thyromegaly  Chest:  Clear to auscultation bilaterally, no added sounds  Cardiovascular:  RRR S1S2 heard, no murmurs or gallops  Abdomen:  Soft, undistended, non tender, no organomegaly, BS present  Extremities: No edema or cyanosis. Distal pulses well felt  Neurological : no focal deficits    Lab Data:  CBC:   Recent Labs     01/13/23 0243 01/14/23  0535   WBC 7.2 8.9   RBC 3.76* 3.87*   HGB 11.7* 11.7*   HCT 33.8* 35.1*   MCV 89.8 90.7   RDW 14.5 15.4   * 128*     BMP:   Recent Labs     01/13/23 0243 01/14/23  0535   * 127*   K 3.1* 3.4*   CL 97* 95*   CO2 23 24   BUN 14 15   CREATININE 0.7 0.7     BNP: No results for input(s): BNP in the last 72 hours. PT/INR: No results for input(s): PROTIME, INR in the last 72 hours. APTT:No results for input(s): APTT in the last 72 hours. CARDIAC ENZYMES: No results for input(s): CKMB, CKMBINDEX, TROPONINI in the last 72 hours.     Invalid input(s): CKTOTAL;3  FASTING LIPID PANEL:No results found for: CHOL, HDL, TRIG  LIVER PROFILE:   Recent Labs     01/13/23 0243   AST 19   ALT 11   BILIDIR <0.2   BILITOT 0.3   ALKPHOS 124           Assessment & Plan:     Patient Active Problem List    Diagnosis Date Noted    Acute hypoxemic respiratory failure ([facility] [address] 01/13/2023    Pneumonia, unspecified organism 01/13/2023    Cerebrovascular accident (CVA) ([facility] [address] 01/13/2023    Coronary artery disease involving native coronary artery of native heart without angina pectoris 01/13/2023     #Acute respiratory failure with hypoxia  -no home O2 at baseline  -requiring 4 L at this time  -no history of asthma or COPD  Exsmoker   -dayne IBD  -wean O2 as tolerated     Influenza  No pneumonia  -check procal-negative  -check resp cx, strep and legionella  -treat for gram-positive pneumonia with zithromax and rocephin   -treat influenza with tamiflu  -droplet isolation  CTA- no PE, no pneumonia  Change abx to azithro daily        Acute copd  Prednisone   IBD     Hyponatremia   1500 ml FR     #Fall x2   #Generalized weakness  -no acute injury other than bruise on right buttock  -PTOT     #Hx of CVA with residual left sided weakness  -supportive care  -uses rollator at home and hemiwalker in public at baseline  -continue ASA and statin     #HypoK  -replace     #Thrombocytopenia  -mild  -monitor on lovenox     #CAD  #PVD  #Bilateral carotid artery stenosis  -continue pletal, ASA and statin     #HTN   -BP elevated  -continue home toprol xl, cozaar and hctz     #HLD  -continue statin and zetia     #Hypothyroidism  -continue synthroid     #OAB  -continue oxybutynin      DVT Prophylaxis: Lovenox  Diet: ADULT DIET;  Regular  Code Status: Full Code      Mark Lucio MD

## 2023-01-14 NOTE — PROGRESS NOTES
RT Inhaler-Nebulizer Bronchodilator Protocol Note    There is a bronchodilator order in the chart from a provider indicating to follow the RT Bronchodilator Protocol and there is an Initiate RT Inhaler-Nebulizer Bronchodilator Protocol order as well (see protocol at bottom of note). CXR Findings:  XR CHEST PORTABLE    Result Date: 1/13/2023  Mild bibasilar airspace opacities favored to represent atelectasis. The findings from the last RT Protocol Assessment were as follows:   History Pulmonary Disease: Smoker 15 pack years or more  Respiratory Pattern: Regular pattern and RR 12-20 bpm  Breath Sounds: Slightly diminished and/or crackles  Cough: Strong, spontaneous, non-productive  Indication for Bronchodilator Therapy: Decreased or absent breath sounds  Bronchodilator Assessment Score: 3    Aerosolized bronchodilator medication orders have been revised according to the RT Inhaler-Nebulizer Bronchodilator Protocol below. Respiratory Therapist to perform RT Therapy Protocol Assessment initially then follow the protocol. Repeat RT Therapy Protocol Assessment PRN for score 0-3 or on second treatment, BID, and PRN for scores above 3. No Indications - adjust the frequency to every 6 hours PRN wheezing or bronchospasm, if no treatments needed after 48 hours then discontinue using Per Protocol order mode. If indication present, adjust the RT bronchodilator orders based on the Bronchodilator Assessment Score as indicated below. Use Inhaler orders unless patient has one or more of the following: on home nebulizer, not able to hold breath for 10 seconds, is not alert and oriented, cannot activate and use MDI correctly, or respiratory rate 25 breaths per minute or more, then use the equivalent nebulizer order(s) with same Frequency and PRN reasons based on the score. If a patient is on this medication at home then do not decrease Frequency below that used at home.     0-3 - enter or revise RT bronchodilator order(s) to equivalent RT Bronchodilator order with Frequency of every 4 hours PRN for wheezing or increased work of breathing using Per Protocol order mode. 4-6 - enter or revise RT Bronchodilator order(s) to two equivalent RT bronchodilator orders with one order with BID Frequency and one order with Frequency of every 4 hours PRN wheezing or increased work of breathing using Per Protocol order mode. 7-10 - enter or revise RT Bronchodilator order(s) to two equivalent RT bronchodilator orders with one order with TID Frequency and one order with Frequency of every 4 hours PRN wheezing or increased work of breathing using Per Protocol order mode. 11-13 - enter or revise RT Bronchodilator order(s) to one equivalent RT bronchodilator order with QID Frequency and an Albuterol order with Frequency of every 4 hours PRN wheezing or increased work of breathing using Per Protocol order mode. Greater than 13 - enter or revise RT Bronchodilator order(s) to one equivalent RT bronchodilator order with every 4 hours Frequency and an Albuterol order with Frequency of every 2 hours PRN wheezing or increased work of breathing using Per Protocol order mode. RT to enter RT Home Evaluation for COPD & MDI Assessment order using Per Protocol order mode.     Electronically signed by Jeferson Oliveros on 1/14/2023 at 8:11 AM

## 2023-01-14 NOTE — PROGRESS NOTES
Sleeping; aroused easily to verbal; denies chest pain, palpitations. Via tele monitor tech, patient had run of SVT; now showing NSR; Hr 80; Spo2 92% on 4 L O2 pnc. Call light in reach.

## 2023-01-14 NOTE — PROGRESS NOTES
See PM shift assessment. Alert; states she is feeling fine. Watching TV; IV abx infusing w/o diff. See vital signs. Call light in reach.

## 2023-01-14 NOTE — PLAN OF CARE
Problem: Discharge Planning  Goal: Discharge to home or other facility with appropriate resources  Outcome: Progressing     Problem: Safety - Adult  Goal: Free from fall injury  Outcome: Progressing     Problem: Skin/Tissue Integrity  Goal: Absence of new skin breakdown  Description: 1. Monitor for areas of redness and/or skin breakdown  2. Assess vascular access sites hourly  3. Every 4-6 hours minimum:  Change oxygen saturation probe site  4. Every 4-6 hours:  If on nasal continuous positive airway pressure, respiratory therapy assess nares and determine need for appliance change or resting period.   Outcome: Progressing     Problem: Pain  Goal: Verbalizes/displays adequate comfort level or baseline comfort level  Outcome: Progressing     Problem: Respiratory - Adult  Goal: Achieves optimal ventilation and oxygenation  Outcome: Progressing     Problem: Cardiovascular - Adult  Goal: Maintains optimal cardiac output and hemodynamic stability  Outcome: Progressing     Problem: Skin/Tissue Integrity - Adult  Goal: Skin integrity remains intact  Outcome: Progressing     Problem: Musculoskeletal - Adult  Goal: Return mobility to safest level of function  Outcome: Progressing

## 2023-01-14 NOTE — PROGRESS NOTES
Occupational Therapy  Inpatient Occupational Therapy  Evaluation and Treatment    Unit: [facility]  Date:  1/14/2023  Patient Name:    Elaine Quiroga  Admitting diagnosis:  Acute hypoxemic respiratory failure ([facility] [address] [J96.01]  Pneumonia, unspecified organism [J18.9]  Admit Date:  1/13/2023  Precautions/Restrictions/WB Status/ Lines/ Wounds/ Oxygen: fall risk, bed/chair alarm, purewick catheter, supplemental O2 (4L), telemetry, continuous pulse ox, and droplet precautions, AFO L foot. Watch BP    Hx of CVA residual left sided weakness. Treatment Time:  0643-6734  Treatment Number: 1     Billable Treatment Time: 50 minutes   Total Treatment Time:   60   minutes    Patient Goals for Therapy:  None stated      Discharge Recommendations: Home with PRN assist and home therapy  DME needs for discharge: Needs Met       Therapy recommendations for staff:   Assist of 1 with use of rolling walker (RW) and gait belt for all transfers to/from BSC/chair    History of Present Illness: Per H&P  \"The patient is a 78 y.o. female with CAD, PVD, bilateral carotid artery stenosis, HTN, HLD, OAB and hx of CVA with residual left sided weakness who presents [facility] as transfer from [facility] ED with complaint of fall x2. Patient states that for the last 2 weeks she has experienced worsening cough and CASTANEDA. She states she has had pneumonia before and thought that's what it was. She lives alone and fell twice this morning. She states her legs became very weak and she fell to her butt. She called her daughter who took her to her PCP and PCP recommended ED evaluation. Patient has bruise to left buttock but otherwise denies injury or pain from her fall. Denies lightheadedness, dizziness, LOC and did not hit her head. She is not on AC. Denies fevers, chills, chest pain, palpitations, abd pain, n/v/c/d, numbness, tingling or swelling of her extremities. Discussed CODE STATUS with the patient who wishes to remain a FULL CODE. \"    XR CHEST   Mild bibasilar airspace opacities favored to represent atelectasis. CTA PULMONARY W CONTRAST  1. No evidence of acute pulmonary embolism or acute aortic disease. No   evidence of aortic aneurysm or dissection. 2. Heart, pericardium and mediastinum appear stable. No evidence of   lymphadenopathy. 3. Moderate emphysema. Bilateral lower lobe atelectatic changes but no   consolidative infiltrates. Increased interstitial markings may indicate mild   interstitial edema. 4. Incidental cholelithiasis without inflammation of the bile duct. 5. Incidental bilateral adrenal nodules. Home Health S4 Level Recommendation:  Level 1 Standard  AM-PAC Score: AM-PAC Inpatient Daily Activity Raw Score: 19     Preadmission Environment    Pt. Lives Alone- dtr works full time (6:30-3) and is unable to provide 24/hr assistance. Home environment:  one story home  Steps to enter first floor:   ramp    Steps to second floor: N/A  Laundry: Main level  Bathroom:  Tub/Shower unit, Tub Transfer Bench, Raised toilet seat w/o arms, and BSC (3 in 1 commode)- by bed. Sleeps in a: Flat bed with hand rail  Equipment owned:  Rollator , rashaad-walker, manual W/C, lift chair, pulse ox, and reacher     If pt returns home with dtr, pt will have a level entry and be able to live on one level. Preadmission Status / PLOF:  History of falls   Yes - \"Legs just got weak and gave out\"  Pt. Able to drive   Yes  Pt Fully independent with ADL's  Yes  Pt. Required assistance from family for:  Cleaning  and shopping  Pt. Fully independent for transfers and gait and walked with: Rollator and in the community uses rashaad walker. Electric cart at [employer]. Subjective  Patient found in Bed with family present (dtr). Pt agreeable to this OT eval & tx. Cognition    A&O x4   Able to follow 2 step commands    Pain  No  Rating:NA  Location:NA  Pain Medicine Status: No request made        Upper Extremity ROM:    Impaired L UE.   Hx of CVA residual left sided weakness. Limited L hand ROM    Upper Extremity Strength:    RUE strength WFL, but not formally assessed w/ MMT    LUE: Weak L UE    Upper Extremity Sensation    WNL    Upper Extremity Proprioception:  Impaired LUE  RUE WNL    Coordination and Tone  Impaired LUE  RUE WNL    Balance  Static Sitting:   Independent- EOB  Dynamic Sitting:  Supervision- EOB to don AFO and Shoes. Static Standing:   SBA- with RW  Dynamic Standing:  Not Tested    Bed mobility:    Supine to sit:   SBA- with flat bed  Sit to supine:   Not Tested  Rolling:    Not Tested  Scooting in sitting:  SBA  Scooting to head of bed:   Not Tested    Bridging:   Not Tested    Transfers:    Sit to stand:  CGA  Stand to sit:  [facility]  Bed to chair:   [facility]  Standard toilet: Not Tested  Bed to [facility]:  Not Tested    Pt completed functional transfer to chair (~10 feet) with RW and gait belt. See PT note for gait analysis. Dressing:      UE:   Not Tested  LE:    SBA - to don AFO and shoes, required increased time to complete. Bathing:    UE:  Not Tested  LE:  Not Tested    Eating:   Not Tested    Toileting:  Not Tested    Grooming/hygiene: Not Tested    Activity Tolerance     Pt tolerated functional transfer to the chair with a decrease in BP. RN made aware. Pt reports no symptoms with drop in BP.       BP (mmHg)  HR (bpm) SpO2 (%)   Semifowlers before activity  164/86 71 bpm 92% on 4L   Seated EOB  147/83  133/80- After ~ 5 minutes  134/79- after ~10 minutes 82 bpm    80 bpm 95% on 4L    97% on 4 L   Seated after activity 115/74,no symptoms     Post activity 120/69  94/65 - After ~ 5 minutes  113/65 - After ~10 minutes 75 93       Positioning Needs:   Reclined in chair with call light and needs in reach. Alarm Set    Exercise / Activities Initiated:   N/A    Patient/Family Education:   Role of OT  Recommendations for DC    Assessment of Deficits: Pt seen for Occupational therapy evaluation in acute care setting.   Pt demonstrated decreased Activity tolerance, ADLs, and Transfers. Pt functioning below baseline and will likely benefit from skilled occupational therapy services to maximize safety and independence. Goal(s) : To be met in 3 Visits:  1). Bed to toilet/BSC: SBA    To be met in 5 Visits:  1). Supine to/from Sit:  Independent  2). Upper Body Bathing:   Supervision  3). Lower Body Bathing:   Supervision  4). Upper Body Dressing:  Supervision  5). Lower Body Dressing:  Supervision  6). Pt to demonstrate UE exs x 15 reps with minimal cues    Rehabilitation Potential:  Good for goals listed above. Strengths for achieving goals include: PLOF, Family Support, and Pt cooperative  Barriers to achieving goals include:  Complexity of condition     Plan: To be seen 2-3 x/wk  while in acute care setting for therapeutic exercises, bed mobility, transfers, dressing, bathing, family/patient education, ADL/IADL retraining, energy conservation training. Kirill Beck OTR/L #[id number]      If patient discharges from this facility prior to next visit, this note will serve as the Discharge Summary

## 2023-01-14 NOTE — PROGRESS NOTES
RT Inhaler-Nebulizer Bronchodilator Protocol Note    There is a bronchodilator order in the chart from a provider indicating to follow the RT Bronchodilator Protocol and there is an Initiate RT Inhaler-Nebulizer Bronchodilator Protocol order as well (see protocol at bottom of note). CXR Findings:  XR CHEST PORTABLE    Result Date: 1/13/2023  Mild bibasilar airspace opacities favored to represent atelectasis. The findings from the last RT Protocol Assessment were as follows:   History Pulmonary Disease: (P) Smoker 15 pack years or more  Respiratory Pattern: (P) Regular pattern and RR 12-20 bpm  Breath Sounds: (P) Intermittent or unilateral wheezes  Cough: (P) Strong, spontaneous, non-productive  Indication for Bronchodilator Therapy: (P) Wheezing associated with pulm disorder, On home bronchodilators  Bronchodilator Assessment Score: (P) 5    Aerosolized bronchodilator medication orders have been revised according to the RT Inhaler-Nebulizer Bronchodilator Protocol below. Respiratory Therapist to perform RT Therapy Protocol Assessment initially then follow the protocol. Repeat RT Therapy Protocol Assessment PRN for score 0-3 or on second treatment, BID, and PRN for scores above 3. No Indications - adjust the frequency to every 6 hours PRN wheezing or bronchospasm, if no treatments needed after 48 hours then discontinue using Per Protocol order mode. If indication present, adjust the RT bronchodilator orders based on the Bronchodilator Assessment Score as indicated below. Use Inhaler orders unless patient has one or more of the following: on home nebulizer, not able to hold breath for 10 seconds, is not alert and oriented, cannot activate and use MDI correctly, or respiratory rate 25 breaths per minute or more, then use the equivalent nebulizer order(s) with same Frequency and PRN reasons based on the score.   If a patient is on this medication at home then do not decrease Frequency below that used at home. 0-3 - enter or revise RT bronchodilator order(s) to equivalent RT Bronchodilator order with Frequency of every 4 hours PRN for wheezing or increased work of breathing using Per Protocol order mode. 4-6 - enter or revise RT Bronchodilator order(s) to two equivalent RT bronchodilator orders with one order with BID Frequency and one order with Frequency of every 4 hours PRN wheezing or increased work of breathing using Per Protocol order mode. 7-10 - enter or revise RT Bronchodilator order(s) to two equivalent RT bronchodilator orders with one order with TID Frequency and one order with Frequency of every 4 hours PRN wheezing or increased work of breathing using Per Protocol order mode. 11-13 - enter or revise RT Bronchodilator order(s) to one equivalent RT bronchodilator order with QID Frequency and an Albuterol order with Frequency of every 4 hours PRN wheezing or increased work of breathing using Per Protocol order mode. Greater than 13 - enter or revise RT Bronchodilator order(s) to one equivalent RT bronchodilator order with every 4 hours Frequency and an Albuterol order with Frequency of every 2 hours PRN wheezing or increased work of breathing using Per Protocol order mode.      Electronically signed by Evie Mcgee RCP on 1/13/2023 at 8:27 PM

## 2023-01-15 ENCOUNTER — APPOINTMENT (OUTPATIENT)
Dept: GENERAL RADIOLOGY | Age: 83
DRG: 152 | End: 2023-01-15
Attending: HOSPITALIST
Payer: MEDICARE

## 2023-01-15 PROBLEM — J10.1 INFLUENZA A: Status: ACTIVE | Noted: 2023-01-15

## 2023-01-15 LAB
ANION GAP SERPL CALCULATED.3IONS-SCNC: 9 MMOL/L (ref 3–16)
BUN BLDV-MCNC: 13 MG/DL (ref 7–20)
CALCIUM SERPL-MCNC: 8.1 MG/DL (ref 8.3–10.6)
CHLORIDE BLD-SCNC: 96 MMOL/L (ref 99–110)
CO2: 26 MMOL/L (ref 21–32)
CREAT SERPL-MCNC: 0.8 MG/DL (ref 0.6–1.2)
GFR SERPL CREATININE-BSD FRML MDRD: >60 ML/MIN/{1.73_M2}
GLUCOSE BLD-MCNC: 114 MG/DL (ref 70–99)
POTASSIUM REFLEX MAGNESIUM: 3.7 MMOL/L (ref 3.5–5.1)
SODIUM BLD-SCNC: 131 MMOL/L (ref 136–145)

## 2023-01-15 PROCEDURE — 80048 BASIC METABOLIC PNL TOTAL CA: CPT

## 2023-01-15 PROCEDURE — 2700000000 HC OXYGEN THERAPY PER DAY

## 2023-01-15 PROCEDURE — 94761 N-INVAS EAR/PLS OXIMETRY MLT: CPT

## 2023-01-15 PROCEDURE — 97530 THERAPEUTIC ACTIVITIES: CPT

## 2023-01-15 PROCEDURE — 36415 COLL VENOUS BLD VENIPUNCTURE: CPT

## 2023-01-15 PROCEDURE — 2580000003 HC RX 258: Performed by: HOSPITALIST

## 2023-01-15 PROCEDURE — 6370000000 HC RX 637 (ALT 250 FOR IP)

## 2023-01-15 PROCEDURE — 99223 1ST HOSP IP/OBS HIGH 75: CPT | Performed by: INTERNAL MEDICINE

## 2023-01-15 PROCEDURE — 6370000000 HC RX 637 (ALT 250 FOR IP): Performed by: HOSPITALIST

## 2023-01-15 PROCEDURE — 6360000002 HC RX W HCPCS: Performed by: HOSPITALIST

## 2023-01-15 PROCEDURE — 6370000000 HC RX 637 (ALT 250 FOR IP): Performed by: INTERNAL MEDICINE

## 2023-01-15 PROCEDURE — 1200000000 HC SEMI PRIVATE

## 2023-01-15 PROCEDURE — 94669 MECHANICAL CHEST WALL OSCILL: CPT

## 2023-01-15 PROCEDURE — 6360000002 HC RX W HCPCS: Performed by: INTERNAL MEDICINE

## 2023-01-15 PROCEDURE — 99233 SBSQ HOSP IP/OBS HIGH 50: CPT | Performed by: INTERNAL MEDICINE

## 2023-01-15 PROCEDURE — 71045 X-RAY EXAM CHEST 1 VIEW: CPT

## 2023-01-15 PROCEDURE — 94640 AIRWAY INHALATION TREATMENT: CPT

## 2023-01-15 RX ORDER — FUROSEMIDE 10 MG/ML
20 INJECTION INTRAMUSCULAR; INTRAVENOUS ONCE
Status: COMPLETED | OUTPATIENT
Start: 2023-01-15 | End: 2023-01-15

## 2023-01-15 RX ADMIN — IPRATROPIUM BROMIDE AND ALBUTEROL SULFATE 1 AMPULE: 2.5; .5 SOLUTION RESPIRATORY (INHALATION) at 07:24

## 2023-01-15 RX ADMIN — OXYBUTYNIN CHLORIDE 10 MG: 5 TABLET, EXTENDED RELEASE ORAL at 08:51

## 2023-01-15 RX ADMIN — AZITHROMYCIN MONOHYDRATE 250 MG: 250 TABLET ORAL at 08:50

## 2023-01-15 RX ADMIN — ATORVASTATIN CALCIUM 20 MG: 10 TABLET, FILM COATED ORAL at 22:28

## 2023-01-15 RX ADMIN — CILOSTAZOL 50 MG: 100 TABLET ORAL at 08:50

## 2023-01-15 RX ADMIN — OSELTAMIVIR PHOSPHATE 75 MG: 75 CAPSULE ORAL at 08:51

## 2023-01-15 RX ADMIN — IPRATROPIUM BROMIDE AND ALBUTEROL SULFATE 1 AMPULE: 2.5; .5 SOLUTION RESPIRATORY (INHALATION) at 19:06

## 2023-01-15 RX ADMIN — ASPIRIN 325 MG: 325 TABLET, COATED ORAL at 08:51

## 2023-01-15 RX ADMIN — SODIUM CHLORIDE, PRESERVATIVE FREE 10 ML: 5 INJECTION INTRAVENOUS at 08:49

## 2023-01-15 RX ADMIN — PREDNISONE 40 MG: 20 TABLET ORAL at 08:50

## 2023-01-15 RX ADMIN — FUROSEMIDE 20 MG: 10 INJECTION, SOLUTION INTRAMUSCULAR; INTRAVENOUS at 08:49

## 2023-01-15 RX ADMIN — CILOSTAZOL 50 MG: 100 TABLET ORAL at 22:28

## 2023-01-15 RX ADMIN — FOLIC ACID 1 MG: 1 TABLET ORAL at 08:50

## 2023-01-15 RX ADMIN — LEVOTHYROXINE SODIUM 50 MCG: 50 TABLET ORAL at 08:51

## 2023-01-15 RX ADMIN — OSELTAMIVIR PHOSPHATE 75 MG: 75 CAPSULE ORAL at 22:28

## 2023-01-15 RX ADMIN — EZETIMIBE 10 MG: 10 TABLET ORAL at 08:51

## 2023-01-15 RX ADMIN — ENOXAPARIN SODIUM 40 MG: 100 INJECTION SUBCUTANEOUS at 08:51

## 2023-01-15 RX ADMIN — LOSARTAN POTASSIUM 100 MG: 100 TABLET, FILM COATED ORAL at 08:51

## 2023-01-15 RX ADMIN — METOPROLOL SUCCINATE 100 MG: 50 TABLET, EXTENDED RELEASE ORAL at 08:51

## 2023-01-15 RX ADMIN — IPRATROPIUM BROMIDE AND ALBUTEROL SULFATE 1 AMPULE: 2.5; .5 SOLUTION RESPIRATORY (INHALATION) at 13:50

## 2023-01-15 NOTE — PROGRESS NOTES
Spo2 continues to fluctuate from 85% to 95% on 6 L high flow. Patient remains asymptomatic; reminding patient to cough and deep breath; voiced understanding. Hob up, laying on left side, call light in reach.

## 2023-01-15 NOTE — PROGRESS NOTES
See PM shift assessment. Pleasant and alert; states feeling better this evening. Still on 4 L O2; spo2 91%. Hob up, respirs e/e, call light in reach.

## 2023-01-15 NOTE — PROGRESS NOTES
RT Inhaler-Nebulizer Bronchodilator Protocol Note    There is a bronchodilator order in the chart from a provider indicating to follow the RT Bronchodilator Protocol and there is an Initiate RT Inhaler-Nebulizer Bronchodilator Protocol order as well (see protocol at bottom of note). CXR Findings:  XR CHEST PORTABLE    Result Date: 1/13/2023  Mild bibasilar airspace opacities favored to represent atelectasis. The findings from the last RT Protocol Assessment were as follows:   History Pulmonary Disease: Chronic pulmonary disease  Respiratory Pattern: Regular pattern and RR 12-20 bpm  Breath Sounds: Slightly diminished and/or crackles  Cough: Strong, spontaneous, non-productive  Indication for Bronchodilator Therapy: Decreased or absent breath sounds  Bronchodilator Assessment Score: 4    Aerosolized bronchodilator medication orders have been revised according to the RT Inhaler-Nebulizer Bronchodilator Protocol below. Respiratory Therapist to perform RT Therapy Protocol Assessment initially then follow the protocol. Repeat RT Therapy Protocol Assessment PRN for score 0-3 or on second treatment, BID, and PRN for scores above 3. No Indications - adjust the frequency to every 6 hours PRN wheezing or bronchospasm, if no treatments needed after 48 hours then discontinue using Per Protocol order mode. If indication present, adjust the RT bronchodilator orders based on the Bronchodilator Assessment Score as indicated below. Use Inhaler orders unless patient has one or more of the following: on home nebulizer, not able to hold breath for 10 seconds, is not alert and oriented, cannot activate and use MDI correctly, or respiratory rate 25 breaths per minute or more, then use the equivalent nebulizer order(s) with same Frequency and PRN reasons based on the score. If a patient is on this medication at home then do not decrease Frequency below that used at home.     0-3 - enter or revise RT bronchodilator order(s) to equivalent RT Bronchodilator order with Frequency of every 4 hours PRN for wheezing or increased work of breathing using Per Protocol order mode. 4-6 - enter or revise RT Bronchodilator order(s) to two equivalent RT bronchodilator orders with one order with BID Frequency and one order with Frequency of every 4 hours PRN wheezing or increased work of breathing using Per Protocol order mode. 7-10 - enter or revise RT Bronchodilator order(s) to two equivalent RT bronchodilator orders with one order with TID Frequency and one order with Frequency of every 4 hours PRN wheezing or increased work of breathing using Per Protocol order mode. 11-13 - enter or revise RT Bronchodilator order(s) to one equivalent RT bronchodilator order with QID Frequency and an Albuterol order with Frequency of every 4 hours PRN wheezing or increased work of breathing using Per Protocol order mode. Greater than 13 - enter or revise RT Bronchodilator order(s) to one equivalent RT bronchodilator order with every 4 hours Frequency and an Albuterol order with Frequency of every 2 hours PRN wheezing or increased work of breathing using Per Protocol order mode. PATIENT WILL BENEFIT FROM TREATMENTS.      Electronically signed by Orlando Moncada RCP on 1/14/2023 at 7:14 PM

## 2023-01-15 NOTE — PROGRESS NOTES
Inpatient Physical Therapy Daily Treatment Note    Unit: [address] [place]  Date:  1/15/2023  Patient Name:    Caretha Goldmann  Admitting diagnosis:  Acute hypoxemic respiratory failure ([facility] [address] [J96.01]  Pneumonia, unspecified organism [J18.9]  Admit Date:  1/13/2023  Precautions/Restrictions/WB Status/ Lines/ Wounds/ Oxygen: Fall risk, Bed/chair alarm, Lines -IV, Supplemental O2 (7 L), and Purewick catheter, Telemetry, Continuous pulse oximetry, and Isolation Precautions: Droplet, L AFO and sneakers with ambulation  Watch BP    Treatment Time: 12:07-12:45  Treatment Number:  2  Timed Code Treatment Minutes: 38 minutes  Total Treatment Minutes:  38 minutes    Patient Goals for Therapy: get up and walk, settle in chair at end of session. Discharge Recommendations:  home with initial 24/7 supervision and home PT  DME needs for discharge: Needs Met       Therapy recommendation for EMS Transport: NA    Therapy recommendations for staff:   Assist of 1 with use of rolling walker (RW) and gait belt for all transfers to/from [facility]  to/from [facility]    History Of Present Illness:     per H&P:  \"The patient is a 78 y.o. female with CAD, PVD, bilateral carotid artery stenosis, HTN, HLD, OAB and hx of CVA with residual left sided weakness who presents [facility] as transfer from [facility] ED with complaint of fall x2. Patient states that for the last 2 weeks she has experienced worsening cough and CASTANEDA. She states she has had pneumonia before and thought that's what it was. She lives alone and fell twice this morning. She states her legs became very weak and she fell to her butt. She called her daughter who took her to her PCP and PCP recommended ED evaluation. Patient has bruise to left buttock but otherwise denies injury or pain from her fall. Denies lightheadedness, dizziness, LOC and did not hit her head. She is not on AC.    Denies fevers, chills, chest pain, palpitations, abd pain, n/v/c/d, numbness, tingling or swelling of her extremities. Discussed CODE STATUS with the patient who wishes to remain a FULL CODE. \"   Past Medical History:    Past Medical History []Expand by Default            Diagnosis Date    CAD (coronary artery disease)      Cerebral artery occlusion with cerebral infarction ([facility] [address]      Hyperlipidemia      Hypertension         Chest CT:  Impression:        1. No evidence of acute pulmonary embolism or acute aortic disease. No   evidence of aortic aneurysm or dissection. 2. Heart, pericardium and mediastinum appear stable. No evidence of   lymphadenopathy. 3. Moderate emphysema. Bilateral lower lobe atelectatic changes but no   consolidative infiltrates. Increased interstitial markings may indicate mild   interstitial edema. 4. Incidental cholelithiasis without inflammation of the bile duct. 5. Incidental bilateral adrenal nodules. Home Health S4 Level Recommendation:  Level 1 Standard  AM-PAC Mobility Score    AM-PAC Inpatient Mobility Raw Score : 22       Pain   No    Cognition    A&O orientation not directly assessed. WFL   Able to follow 2 step commands    Subjective  Patient lying supine in bed with  daughter and granddaughter present . Pt agreeable to this PT tx.      Balance  Sitting:  Normal; Independent  Comments: assisted in donning of L AFO and B shoes (writer assisted, for concern of influence on BP as indicated by yesterday's therapist)    Standing: Good ; SBA  Comments: using RW, no dizziness    Bed Mobility   Supine to Sit:    Modified Independent  Sit to Supine:   Not Tested  Rolling:   Not Tested  Scooting in sitting: Independent  Scooting in supine:  Not Tested    Transfer Training     Sit to stand:   SBA  Stand to sit:   SBA  Bed to Chair:   SBA with use of gait belt and rolling walker (RW)    Gait gait completed as indicated below  Distance:      60 ft + 40 ft  Deviations (firm surface/linoleum):  decreased akua, step through pattern, decreased step length bilaterally, and compensatory hip hike to clear LLE  Assistive Device Used:    gait belt and rolling walker (RW)  Level of Assist:    CGA/SBA typcically. One instance of Min A due to LOB, pt able to reach towards bedrail to stabilize herself as well. Comment: therapist managing O2 line. Stair Training deferred, pt does not have stairs in home environment    Therapeutic Exercise  Ankle pumps x 20 reps RLE    Activity Tolerance   No complaints. Pt says she feels better than yesterday. BP (mmHg)     HR (bpm) SpO2 (%)   Semifowlers before activity  119/68 74 96% on 7L   Seated EOB             139/90 82 91-96% on 7L   Standing 121/71     After 60 ft ambulation on 7L  105 96% on 7L   After 40 ft ambulation on 6L   96% on 6L   Seated at rest - weaned to 5L   95% on 5L     Positioning Needs:   Reclined in chair with call light and needs in reach. Alarm Set    Other  RN aware of pt status and response to treatment. Left on 5L O2 at end of session. Patient/Family Education   Pt educated on role of inpatient PT, POC, importance of continued activity, DC recommendations, safety awareness, transfer techniques, and calling for assist with mobility. Assessment  Pt able to tolerate increased activity today, with stable vitals. Supplemental O2 weaned from 7L to 5L within the session. Pt functioning below baseline and would benefit from skilled physical therapy while in acute setting to address current deficits mentioned above and progress activity as tolerated. Recommending home with initial 24/7 supervision if available, and home PT. Goals : To be met in 3 visits:  1). Independent with LE Ex x 10 reps  2.) Bed to chair: Independent    To be met in 6 visits:  1). Supine to/from sit: Independent  2). Sit to/from stand: Independent  3). Bed to chair: Independent  4). Gait: Ambulate 150 ft.  with  SBA and use of rolling walker (RW)  5).   Tolerate B LE exercises 3 sets of 10-15 reps      Rehabilitation Potential: Good  Strengths for achieving goals include:   Pt motivated, PLOF, Family Support, and Pt cooperative   Barriers to achieving goals include:    Complexity of condition and Weakness    Plan    To be seen 3-5 x / week  while in acute care setting for therapeutic exercises, bed mobility, transfers, progressive gait training, balance training, and family/patient education. Signature: Julia Hawk, PT, DPT    If patient discharges from this facility prior to next visit, this note will serve as the Discharge Summary.

## 2023-01-15 NOTE — PROGRESS NOTES
Admit: 2023    Name:  Robert Vivas  Room:  [facility]0212-01  MRN:    [id number]    Daily Progress Note for 1/15/2023   Admitted with influenza A and acute hypoxic respiratory failure    Interval History:     Oxygen requirement went up to 6 L this morning. Scheduled Meds:   azithromycin  250 mg Oral Daily    predniSONE  40 mg Oral Daily    ipratropium-albuterol  1 ampule Inhalation TID    sodium chloride flush  5-40 mL IntraVENous 2 times per day    enoxaparin  40 mg SubCUTAneous Daily    aspirin  325 mg Oral Daily    atorvastatin  20 mg Oral Nightly    cilostazol  50 mg Oral BID    ezetimibe  10 mg Oral Daily    folic acid  1 mg Oral Daily    hydroCHLOROthiazide  12.5 mg Oral Daily    levothyroxine  50 mcg Oral Daily    losartan  100 mg Oral Daily    metoprolol succinate  100 mg Oral Daily    oxybutynin  10 mg Oral Daily    oseltamivir  75 mg Oral BID       Continuous Infusions:   sodium chloride Stopped (23)       PRN Meds:  sodium chloride flush, sodium chloride, ondansetron **OR** ondansetron, polyethylene glycol, acetaminophen **OR** acetaminophen, potassium chloride **OR** potassium alternative oral replacement **OR** potassium chloride, magnesium sulfate, albuterol sulfate HFA                  Objective:     Temp  Av.9 °F (36.1 °C)  Min: 96.7 °F (35.9 °C)  Max: 97.2 °F (36.2 °C)  Pulse  Av.4  Min: 63  Max: 80  BP  Min: 128/68  Max: 183/91  SpO2  Av.7 %  Min: 85 %  Max: 97 %  Patient Vitals for the past 4 hrs:   Pulse SpO2   01/15/23 0724 -- 92 %   01/15/23 0535 64 94 %           Intake/Output Summary (Last 24 hours) at 1/15/2023 5730  Last data filed at 2023 1823  Gross per 24 hour   Intake --   Output 500 ml   Net -500 ml         Physical Exam:  Gen: No distress. Alert. Eyes: PERRL. No sclera icterus. No conjunctival injection. ENT: No discharge. Pharynx clear. Neck: Trachea midline. Normal thyroid. Resp: No accessory muscle use. No crackles. No wheezes. No rhonchi. No dullness on percussion. CV: Regular rate. Regular rhythm. No murmur or rub. No edema. GI: Non-tender. Non-distended. No masses. No organomegaly. Normal bowel sounds. No hernia. Skin: Warm and dry. No nodule on exposed extremities. No rash on exposed extremities. Lymph: No cervical LAD. No supraclavicular LAD. M/S: No cyanosis. No joint deformity. No clubbing. Neuro: Awake. Moves all 4 extremities, non focal  Psych: Oriented x 3. No anxiety or agitation. Lab Data:  CBC:   Recent Labs     01/13/23 0243 01/14/23  0535   WBC 7.2 8.9   RBC 3.76* 3.87*   HGB 11.7* 11.7*   HCT 33.8* 35.1*   MCV 89.8 90.7   RDW 14.5 15.4   * 128*       BMP:   Recent Labs     01/13/23 0243 01/14/23  0535 01/15/23  0537   * 127* 131*   K 3.1* 3.4* 3.7   CL 97* 95* 96*   CO2 23 24 26   BUN 14 15 13   CREATININE 0.7 0.7 0.8       BNP: No results for input(s): BNP in the last 72 hours. PT/INR: No results for input(s): PROTIME, INR in the last 72 hours. APTT:No results for input(s): APTT in the last 72 hours. CARDIAC ENZYMES: No results for input(s): CKMB, CKMBINDEX, TROPONINI in the last 72 hours. Invalid input(s): CKTOTAL;3  FASTING LIPID PANEL:No results found for: CHOL, HDL, TRIG  LIVER PROFILE:   Recent Labs     01/13/23 0243   AST 19   ALT 11   BILIDIR <0.2   BILITOT 0.3   ALKPHOS 124           XR CHEST PORTABLE   Final Result   1. No acute abnormality. CTA PULMONARY W CONTRAST   Final Result   1. No evidence of acute pulmonary embolism or acute aortic disease. No   evidence of aortic aneurysm or dissection. 2. Heart, pericardium and mediastinum appear stable. No evidence of   lymphadenopathy. 3. Moderate emphysema. Bilateral lower lobe atelectatic changes but no   consolidative infiltrates. Increased interstitial markings may indicate mild   interstitial edema. 4. Incidental cholelithiasis without inflammation of the bile duct. 5. Incidental bilateral adrenal nodules.          XR CHEST PORTABLE   Final Result   Mild bibasilar airspace opacities favored to represent atelectasis. Assessment & Plan:     Patient Active Problem List    Diagnosis Date Noted    COPD with acute exacerbation ([facility] [address] 01/14/2023    Hyponatremia 01/14/2023    Acute hypoxemic respiratory failure ([facility] [address] 01/13/2023    Pneumonia, unspecified organism 01/13/2023    Cerebrovascular accident (CVA) ([facility] [address] 01/13/2023    Coronary artery disease involving native coronary artery of native heart without angina pectoris 01/13/2023     #Acute respiratory failure with hypoxia  -no home O2 at baseline  -requiring 4 L at this time--> 6 L   Exsmoker   -dayne IBD  -wean O2 as tolerated     Influenza  No pneumonia  -check procal-negative  -check resp cx, strep and legionella  -treat for gram-positive pneumonia with zithromax and rocephin   -treat influenza with tamiflu  -droplet isolation  CTA- no PE, no pneumonia,emphysema, mild interstitial edema  Change abx to azithro daily   Lasix 20 mg IV x 1 today  Pulm consult   Stat CXR from today- normal.     Acute copd  Prednisone   IBD     Hyponatremia   1500 ml FR -continue     #Fall x2   #Generalized weakness  -no acute injury other than bruise on right buttock  -PTOT     #Hx of CVA with residual left sided weakness  -supportive care  -uses rollator at home and hemiwalker in public at baseline  -continue ASA and statin     #HypoK  -replace     #Thrombocytopenia  -mild  -monitor on lovenox     #CAD  #PVD  #Bilateral carotid artery stenosis  -continue pletal, ASA and statin     #HTN   -BP elevated  -continue home toprol xl, cozaar and hctz     #HLD  -continue statin and zetia     #Hypothyroidism  -continue synthroid     #OAB  -continue oxybutynin      DVT Prophylaxis: Lovenox  Diet: ADULT DIET;  Regular  Code Status: Full Code      Raji Pandey MD

## 2023-01-15 NOTE — PROGRESS NOTES
RT Inhaler-Nebulizer Bronchodilator Protocol Note    There is a bronchodilator order in the chart from a provider indicating to follow the RT Bronchodilator Protocol and there is an Initiate RT Inhaler-Nebulizer Bronchodilator Protocol order as well (see protocol at bottom of note). CXR Findings:  XR CHEST PORTABLE    Result Date: 1/13/2023  Mild bibasilar airspace opacities favored to represent atelectasis. The findings from the last RT Protocol Assessment were as follows:   History Pulmonary Disease: Chronic pulmonary disease  Respiratory Pattern: Regular pattern and RR 12-20 bpm  Breath Sounds: Slightly diminished and/or crackles  Cough: Strong, spontaneous, non-productive  Indication for Bronchodilator Therapy: Decreased or absent breath sounds  Bronchodilator Assessment Score: 4    Aerosolized bronchodilator medication orders have been revised according to the RT Inhaler-Nebulizer Bronchodilator Protocol below. Respiratory Therapist to perform RT Therapy Protocol Assessment initially then follow the protocol. Repeat RT Therapy Protocol Assessment PRN for score 0-3 or on second treatment, BID, and PRN for scores above 3. No Indications - adjust the frequency to every 6 hours PRN wheezing or bronchospasm, if no treatments needed after 48 hours then discontinue using Per Protocol order mode. If indication present, adjust the RT bronchodilator orders based on the Bronchodilator Assessment Score as indicated below. Use Inhaler orders unless patient has one or more of the following: on home nebulizer, not able to hold breath for 10 seconds, is not alert and oriented, cannot activate and use MDI correctly, or respiratory rate 25 breaths per minute or more, then use the equivalent nebulizer order(s) with same Frequency and PRN reasons based on the score. If a patient is on this medication at home then do not decrease Frequency below that used at home.     0-3 - enter or revise RT bronchodilator order(s) to equivalent RT Bronchodilator order with Frequency of every 4 hours PRN for wheezing or increased work of breathing using Per Protocol order mode. 4-6 - enter or revise RT Bronchodilator order(s) to two equivalent RT bronchodilator orders with one order with BID Frequency and one order with Frequency of every 4 hours PRN wheezing or increased work of breathing using Per Protocol order mode. 7-10 - enter or revise RT Bronchodilator order(s) to two equivalent RT bronchodilator orders with one order with TID Frequency and one order with Frequency of every 4 hours PRN wheezing or increased work of breathing using Per Protocol order mode. 11-13 - enter or revise RT Bronchodilator order(s) to one equivalent RT bronchodilator order with QID Frequency and an Albuterol order with Frequency of every 4 hours PRN wheezing or increased work of breathing using Per Protocol order mode. Greater than 13 - enter or revise RT Bronchodilator order(s) to one equivalent RT bronchodilator order with every 4 hours Frequency and an Albuterol order with Frequency of every 2 hours PRN wheezing or increased work of breathing using Per Protocol order mode. RT to enter RT Home Evaluation for COPD & MDI Assessment order using Per Protocol order mode.     Electronically signed by [employer] Back on 1/15/2023 at 7:27 AM

## 2023-01-15 NOTE — CONSULTS
Reason for referral and CC: SOB, flu    HISTORY OF PRESENT ILLNESS: 79 yo female with a h/o CAD presented several days ago with c/o falling at home as well as SOB and cough. Influenza is per pt report. Her O2 has not improved so far. CT chest did not show pneumonia but did show emphysema. Past Medical History:   Diagnosis Date    CAD (coronary artery disease)     Cerebral artery occlusion with cerebral infarction ([address]     Hyperlipidemia     Hypertension      Past Surgical History:   Procedure Laterality Date    CARDIAC SURGERY      cardiac cath    HYSTERECTOMY (CERVIX STATUS UNKNOWN)       Family History  family history is not on file. Social History:  reports that she has quit smoking. Her smoking use included cigarettes. She has a 1.00 pack-year smoking history. She has never used smokeless tobacco.   reports no history of alcohol use. ALLERGIES:  Patient is allergic to codeine.   Continuous Infusions:   sodium chloride Stopped (01/13/23 1913)     Scheduled Meds:   azithromycin  250 mg Oral Daily    predniSONE  40 mg Oral Daily    ipratropium-albuterol  1 ampule Inhalation TID    sodium chloride flush  5-40 mL IntraVENous 2 times per day    enoxaparin  40 mg SubCUTAneous Daily    aspirin  325 mg Oral Daily    atorvastatin  20 mg Oral Nightly    cilostazol  50 mg Oral BID    ezetimibe  10 mg Oral Daily    folic acid  1 mg Oral Daily    [Held by provider] hydroCHLOROthiazide  12.5 mg Oral Daily    levothyroxine  50 mcg Oral Daily    losartan  100 mg Oral Daily    metoprolol succinate  100 mg Oral Daily    oxybutynin  10 mg Oral Daily    oseltamivir  75 mg Oral BID     PRN Meds:  sodium chloride flush, sodium chloride, ondansetron **OR** ondansetron, polyethylene glycol, acetaminophen **OR** acetaminophen, potassium chloride **OR** potassium alternative oral replacement **OR** potassium chloride, magnesium sulfate, albuterol sulfate HFA    REVIEW OF SYSTEMS:  Constitutional: Negative for fever  HENT: Negative for sore throat  Eyes: Negative for redness   Respiratory: +for dyspnea, cough  Cardiovascular: Negative for chest pain  Gastrointestinal: Negative for vomiting, diarrhea   Genitourinary: Negative for hematuria   Musculoskeletal: Negative for arthralgias   Skin: Negative for rash  Neurological: Negative for syncope  Hematological: Negative for adenopathy  Psychiatric/Behavorial: Negative for anxiety    PHYSICAL EXAM: /64   Pulse 79   Temp 97.2 °F (36.2 °C) (Oral)   Resp 18   Ht 5' 7\" (1.702 m)   Wt 157 lb (71.2 kg)   SpO2 96%   BMI 24.59 kg/m²  on 4L  Constitutional:  No acute distress. Eyes: PERRL. Conjunctivae anicteric. ENT: Normal nose. Normal tongue. Neck:  Trachea is midline. No thyroid tenderness. Respiratory: No accessory muscle usage. decreased breath sounds. No wheezes. No rales. No Rhonchi. Cardiovascular: Normal S1S2. No digit clubbing. No digit cyanosis. No LE edema. Capillary refill is normal.   Gastrointestinal: No mass palpated. No tenderness palpated. Skin: No rash on the exposed extremities. No Nodules or induration on exposed extremities. Psychiatric: No anxiety or Agitation. Alert and Oriented to person, place and time. CBC:   Recent Labs     01/13/23  0243 01/14/23  0535   WBC 7.2 8.9   HGB 11.7* 11.7*   HCT 33.8* 35.1*   MCV 89.8 90.7   * 128*     BMP:   Recent Labs     01/13/23  0243 01/14/23  0535 01/15/23  0537   * 127* 131*   K 3.1* 3.4* 3.7   CL 97* 95* 96*   CO2 23 24 26   BUN 14 15 13   CREATININE 0.7 0.7 0.8        Recent Labs     01/13/23  0243   AST 19   ALT 11   BILIDIR <0.2   BILITOT 0.3   ALKPHOS 124     No results for input(s): PROTIME, INR in the last 72 hours. No results for input(s): NITRITE, COLORU, PHUR, LABCAST, WBCUA, RBCUA, MUCUS, TRICHOMONAS, YEAST, BACTERIA, CLARITYU, SPECGRAV, LEUKOCYTESUR, UROBILINOGEN, BILIRUBINUR, BLOODU, GLUCOSEU, AMORPHOUS in the last 72 hours.     Invalid input(s): KETONESU  No results for input(s): PHART, EUQ0OUN, PO2ART in the last 72 hours. CTPA 1/13/23:   1. No evidence of acute pulmonary embolism or acute aortic disease. No   evidence of aortic aneurysm or dissection. 2. Heart, pericardium and mediastinum appear stable. No evidence of   lymphadenopathy. 3. Moderate emphysema. Bilateral lower lobe atelectatic changes but no   consolidative infiltrates. Increased interstitial markings may indicate mild   interstitial edema. 4. Incidental cholelithiasis without inflammation of the bile duct. 5. Incidental bilateral adrenal nodules. ASSESSMENT:  Acute hypoxic respiratory failure   Influenza A   Pulmonary emphysema noted on Chest CT    PLAN:  Supplemental oxygen to maintain SaO2 >92%; wean as tolerated  Intensive inhaled bronchodilator therapy. Trial of prednisone as her hypoxia worsened  Azithromycin  Tamiflu  Sputum GS&C.       Thank you Lennox Perrin MD for this consult

## 2023-01-15 NOTE — PROGRESS NOTES
Spo2 83%; on 5 L pnc; increased to 6 L at this time. Patient is alert; respirs e/e; denies feeling sob. Skin pink, w/d. Hob up; respiratory therapy present; placed on 6 L high flow; encouraged deep breathing and coughing; Spo2 now 94%. See vital signs.

## 2023-01-15 NOTE — PROGRESS NOTES
Shift report received from [facility]. Patient is sleeping; respirs e/e, call light in reach.

## 2023-01-15 NOTE — PLAN OF CARE
Problem: Discharge Planning  Goal: Discharge to home or other facility with appropriate resources  Outcome: Progressing     Problem: Safety - Adult  Goal: Free from fall injury  1/15/2023 1041 by Melia Eaton RN  Outcome: Progressing     Problem: Skin/Tissue Integrity  Goal: Absence of new skin breakdown  Description: 1. Monitor for areas of redness and/or skin breakdown  2. Assess vascular access sites hourly  3. Every 4-6 hours minimum:  Change oxygen saturation probe site  4. Every 4-6 hours:  If on nasal continuous positive airway pressure, respiratory therapy assess nares and determine need for appliance change or resting period.   Outcome: Progressing     Problem: Pain  Goal: Verbalizes/displays adequate comfort level or baseline comfort level  Outcome: Progressing     Problem: Respiratory - Adult  Goal: Achieves optimal ventilation and oxygenation  1/15/2023 1041 by Melia Eaton RN  Outcome: Progressing     Problem: Cardiovascular - Adult  Goal: Maintains optimal cardiac output and hemodynamic stability  Outcome: Progressing     Problem: Skin/Tissue Integrity - Adult  Goal: Skin integrity remains intact  Outcome: Progressing     Problem: Musculoskeletal - Adult  Goal: Return mobility to safest level of function  Outcome: Progressing

## 2023-01-15 NOTE — PROGRESS NOTES
Bedside report given and pt care transferred to [facility]. Pt denies needs at this time. Call light within reach.

## 2023-01-15 NOTE — PROGRESS NOTES
Consult has been called to Dr. Shante Segura on 1/15/23. Spoke with harper.  7:33 AM    Hua Cristobal  1/15/2023

## 2023-01-16 LAB
ANION GAP SERPL CALCULATED.3IONS-SCNC: 9 MMOL/L (ref 3–16)
BUN BLDV-MCNC: 15 MG/DL (ref 7–20)
CALCIUM SERPL-MCNC: 8.2 MG/DL (ref 8.3–10.6)
CHLORIDE BLD-SCNC: 95 MMOL/L (ref 99–110)
CO2: 26 MMOL/L (ref 21–32)
CREAT SERPL-MCNC: 0.8 MG/DL (ref 0.6–1.2)
GFR SERPL CREATININE-BSD FRML MDRD: >60 ML/MIN/{1.73_M2}
GLUCOSE BLD-MCNC: 100 MG/DL (ref 70–99)
POTASSIUM REFLEX MAGNESIUM: 3.7 MMOL/L (ref 3.5–5.1)
SODIUM BLD-SCNC: 130 MMOL/L (ref 136–145)

## 2023-01-16 PROCEDURE — 6370000000 HC RX 637 (ALT 250 FOR IP): Performed by: HOSPITALIST

## 2023-01-16 PROCEDURE — 94669 MECHANICAL CHEST WALL OSCILL: CPT

## 2023-01-16 PROCEDURE — 80048 BASIC METABOLIC PNL TOTAL CA: CPT

## 2023-01-16 PROCEDURE — 1200000000 HC SEMI PRIVATE

## 2023-01-16 PROCEDURE — 94640 AIRWAY INHALATION TREATMENT: CPT

## 2023-01-16 PROCEDURE — 6370000000 HC RX 637 (ALT 250 FOR IP): Performed by: INTERNAL MEDICINE

## 2023-01-16 PROCEDURE — 36415 COLL VENOUS BLD VENIPUNCTURE: CPT

## 2023-01-16 PROCEDURE — 6370000000 HC RX 637 (ALT 250 FOR IP)

## 2023-01-16 PROCEDURE — 99232 SBSQ HOSP IP/OBS MODERATE 35: CPT

## 2023-01-16 PROCEDURE — 99233 SBSQ HOSP IP/OBS HIGH 50: CPT | Performed by: INTERNAL MEDICINE

## 2023-01-16 PROCEDURE — 2580000003 HC RX 258: Performed by: HOSPITALIST

## 2023-01-16 PROCEDURE — 94761 N-INVAS EAR/PLS OXIMETRY MLT: CPT

## 2023-01-16 PROCEDURE — 2700000000 HC OXYGEN THERAPY PER DAY

## 2023-01-16 PROCEDURE — 6360000002 HC RX W HCPCS: Performed by: HOSPITALIST

## 2023-01-16 RX ADMIN — LEVOTHYROXINE SODIUM 50 MCG: 50 TABLET ORAL at 08:55

## 2023-01-16 RX ADMIN — CILOSTAZOL 50 MG: 100 TABLET ORAL at 21:54

## 2023-01-16 RX ADMIN — OXYBUTYNIN CHLORIDE 10 MG: 5 TABLET, EXTENDED RELEASE ORAL at 08:55

## 2023-01-16 RX ADMIN — CILOSTAZOL 50 MG: 100 TABLET ORAL at 08:56

## 2023-01-16 RX ADMIN — ATORVASTATIN CALCIUM 20 MG: 10 TABLET, FILM COATED ORAL at 21:54

## 2023-01-16 RX ADMIN — OSELTAMIVIR PHOSPHATE 75 MG: 75 CAPSULE ORAL at 21:54

## 2023-01-16 RX ADMIN — SODIUM CHLORIDE, PRESERVATIVE FREE 7 ML: 5 INJECTION INTRAVENOUS at 21:56

## 2023-01-16 RX ADMIN — OSELTAMIVIR PHOSPHATE 75 MG: 75 CAPSULE ORAL at 08:55

## 2023-01-16 RX ADMIN — FOLIC ACID 1 MG: 1 TABLET ORAL at 08:55

## 2023-01-16 RX ADMIN — ENOXAPARIN SODIUM 40 MG: 100 INJECTION SUBCUTANEOUS at 08:55

## 2023-01-16 RX ADMIN — PREDNISONE 40 MG: 20 TABLET ORAL at 08:55

## 2023-01-16 RX ADMIN — METOPROLOL SUCCINATE 100 MG: 50 TABLET, EXTENDED RELEASE ORAL at 08:55

## 2023-01-16 RX ADMIN — LOSARTAN POTASSIUM 100 MG: 100 TABLET, FILM COATED ORAL at 08:55

## 2023-01-16 RX ADMIN — IPRATROPIUM BROMIDE AND ALBUTEROL SULFATE 1 AMPULE: 2.5; .5 SOLUTION RESPIRATORY (INHALATION) at 19:18

## 2023-01-16 RX ADMIN — IPRATROPIUM BROMIDE AND ALBUTEROL SULFATE 1 AMPULE: 2.5; .5 SOLUTION RESPIRATORY (INHALATION) at 13:46

## 2023-01-16 RX ADMIN — SODIUM CHLORIDE, PRESERVATIVE FREE 10 ML: 5 INJECTION INTRAVENOUS at 08:56

## 2023-01-16 RX ADMIN — ASPIRIN 325 MG: 325 TABLET, COATED ORAL at 08:55

## 2023-01-16 RX ADMIN — AZITHROMYCIN MONOHYDRATE 250 MG: 250 TABLET ORAL at 08:55

## 2023-01-16 RX ADMIN — EZETIMIBE 10 MG: 10 TABLET ORAL at 08:55

## 2023-01-16 NOTE — PROGRESS NOTES
Progress Note    Admit Date:  1/13/2023    Admitted with influenza A and acute hypoxic respiratory failure    Subjective:  Ms. Tapan Servin reports feeling much better today. Cough and SOB have improved. She is seen on 3L O2. Objective:   Patient Vitals for the past 4 hrs:   BP Temp Temp src Pulse Resp SpO2   01/16/23 1445 131/74 99 °F (37.2 °C) Oral 74 16 92 %   01/16/23 1346 -- -- -- -- -- 91 %   01/16/23 1230 -- -- -- 98 -- 100 %   01/16/23 1215 -- -- -- -- -- 98 %          Intake/Output Summary (Last 24 hours) at 1/16/2023 1517  Last data filed at 1/16/2023 0830  Gross per 24 hour   Intake 360 ml   Output 400 ml   Net -40 ml       Physical Exam:  Gen: No distress. Alert. Eyes: PERRL. No sclera icterus. No conjunctival injection. Neck: No JVD. Trachea midline. Resp: No accessory muscle use. No crackles. No wheezes. + rhonchi. CV: Regular rate. Regular rhythm. No murmur. No rub. No edema. Peripheral Pulses: +2 palpable, equal bilaterally   GI: Non-tender. Non-distended. Normal bowel sounds. Skin: Warm and dry. No nodule on exposed extremities. No rash on exposed extremities. M/S: No cyanosis. No joint deformity. No clubbing. Neuro: Awake. Grossly nonfocal    Psych: Oriented x 3. No anxiety or agitation.        Scheduled Meds:   azithromycin  250 mg Oral Daily    predniSONE  40 mg Oral Daily    ipratropium-albuterol  1 ampule Inhalation TID    sodium chloride flush  5-40 mL IntraVENous 2 times per day    enoxaparin  40 mg SubCUTAneous Daily    aspirin  325 mg Oral Daily    atorvastatin  20 mg Oral Nightly    cilostazol  50 mg Oral BID    ezetimibe  10 mg Oral Daily    folic acid  1 mg Oral Daily    [Held by provider] hydroCHLOROthiazide  12.5 mg Oral Daily    levothyroxine  50 mcg Oral Daily    losartan  100 mg Oral Daily    metoprolol succinate  100 mg Oral Daily    oxybutynin  10 mg Oral Daily    oseltamivir  75 mg Oral BID       Continuous Infusions:   sodium chloride Stopped (01/13/23 1913) PRN Meds:  sodium chloride flush, sodium chloride, ondansetron **OR** ondansetron, polyethylene glycol, acetaminophen **OR** acetaminophen, potassium chloride **OR** potassium alternative oral replacement **OR** potassium chloride, magnesium sulfate, albuterol sulfate HFA      Data:  CBC:   Recent Labs     01/14/23  0535   WBC 8.9   HGB 11.7*   HCT 35.1*   MCV 90.7   *     BMP:   Recent Labs     01/14/23  0535 01/15/23  0537 01/16/23  0529   * 131* 130*   K 3.4* 3.7 3.7   CL 95* 96* 95*   CO2 24 26 26   BUN 15 13 15   CREATININE 0.7 0.8 0.8       CULTURES  Covid not detected  Influenza A detected  Legionella not detected  Strep pneumoniae not detected      RADIOLOGY  XR CHEST PORTABLE   Final Result   1. No acute abnormality. CTA PULMONARY W CONTRAST   Final Result   1. No evidence of acute pulmonary embolism or acute aortic disease. No   evidence of aortic aneurysm or dissection. 2. Heart, pericardium and mediastinum appear stable. No evidence of   lymphadenopathy. 3. Moderate emphysema. Bilateral lower lobe atelectatic changes but no   consolidative infiltrates. Increased interstitial markings may indicate mild   interstitial edema. 4. Incidental cholelithiasis without inflammation of the bile duct. 5. Incidental bilateral adrenal nodules. XR CHEST PORTABLE   Final Result   Mild bibasilar airspace opacities favored to represent atelectasis.              Assessment/Plan:  #Acute respiratory failure with hypoxia  #COPD AE  #Influenza A  -no home O2 at baseline  -requiring up to 7L --> 3L, continue to wean  -CTA shows no PE, no pneumonia, significant for emphysema, mild interstitial edema  -procal negative  -legionella and strep negative   -continue IBD  -prednisone taper   -tamiflu  -zithromax and rocephin --> zithromax po  -droplet isolation   -lasix 20 mg IV x 1   -pulmonology following     #Hyponatremia  -1500 ml FR   -continue      #Fall x2   #Generalized weakness  -no acute injury other than bruise on right buttock  -PT/OT     #Hx of CVA with residual left sided weakness  -supportive care  -uses rollator at home and hemiwalker in public at baseline  -continue ASA and statin     #Hypokalemia   -replace     #Thrombocytopenia  -mild  -monitor on lovenox     #CAD  #PVD  #Bilateral carotid artery stenosis  -continue pletal, ASA and statin     #HTN   -BP elevated  -continue home toprol xl, cozaar and hctz     #HLD  -continue statin and zetia     #Hypothyroidism  -continue synthroid     #OAB  -continue oxybutynin     DVT Prophylaxis: Lovenox  Diet: ADULT DIET;  Regular; 1500 ml  Code Status: Full Code    Basilio Rodriguez PA-C  1/16/2023  3:17 PM

## 2023-01-16 NOTE — PROGRESS NOTES
Handoff report and transfer of care given at bedside to [facility]. Patient in stable condition, denies needs/concerns at this time. Call light within reach.

## 2023-01-16 NOTE — PROGRESS NOTES
RT Inhaler-Nebulizer Bronchodilator Protocol Note    There is a bronchodilator order in the chart from a provider indicating to follow the RT Bronchodilator Protocol and there is an Initiate RT Inhaler-Nebulizer Bronchodilator Protocol order as well (see protocol at bottom of note). CXR Findings:  XR CHEST PORTABLE    Result Date: 1/15/2023  1. No acute abnormality. The findings from the last RT Protocol Assessment were as follows:   History Pulmonary Disease: Chronic pulmonary disease  Respiratory Pattern: Regular pattern and RR 12-20 bpm  Breath Sounds: Slightly diminished and/or crackles  Cough: Strong, spontaneous, non-productive  Indication for Bronchodilator Therapy: Decreased or absent breath sounds  Bronchodilator Assessment Score: 4    Aerosolized bronchodilator medication orders have been revised according to the RT Inhaler-Nebulizer Bronchodilator Protocol below. Respiratory Therapist to perform RT Therapy Protocol Assessment initially then follow the protocol. Repeat RT Therapy Protocol Assessment PRN for score 0-3 or on second treatment, BID, and PRN for scores above 3. No Indications - adjust the frequency to every 6 hours PRN wheezing or bronchospasm, if no treatments needed after 48 hours then discontinue using Per Protocol order mode. If indication present, adjust the RT bronchodilator orders based on the Bronchodilator Assessment Score as indicated below. Use Inhaler orders unless patient has one or more of the following: on home nebulizer, not able to hold breath for 10 seconds, is not alert and oriented, cannot activate and use MDI correctly, or respiratory rate 25 breaths per minute or more, then use the equivalent nebulizer order(s) with same Frequency and PRN reasons based on the score. If a patient is on this medication at home then do not decrease Frequency below that used at home.     0-3 - enter or revise RT bronchodilator order(s) to equivalent RT Bronchodilator order with Frequency of every 4 hours PRN for wheezing or increased work of breathing using Per Protocol order mode. 4-6 - enter or revise RT Bronchodilator order(s) to two equivalent RT bronchodilator orders with one order with BID Frequency and one order with Frequency of every 4 hours PRN wheezing or increased work of breathing using Per Protocol order mode. 7-10 - enter or revise RT Bronchodilator order(s) to two equivalent RT bronchodilator orders with one order with TID Frequency and one order with Frequency of every 4 hours PRN wheezing or increased work of breathing using Per Protocol order mode. 11-13 - enter or revise RT Bronchodilator order(s) to one equivalent RT bronchodilator order with QID Frequency and an Albuterol order with Frequency of every 4 hours PRN wheezing or increased work of breathing using Per Protocol order mode. Greater than 13 - enter or revise RT Bronchodilator order(s) to one equivalent RT bronchodilator order with every 4 hours Frequency and an Albuterol order with Frequency of every 2 hours PRN wheezing or increased work of breathing using Per Protocol order mode. PATIENT WILL BENEFIT FROM TREATMENTS.      Electronically signed by Demarcus Palacios RCP on 1/15/2023 at 7:10 PM

## 2023-01-16 NOTE — PROGRESS NOTES
Shift assessment complete; see flow sheet. Scheduled medications administered; See MAR. IV infusing without difficulty. O2 5.5 LPM nc in place. Pt denies pain at this time. Pt denies any needs at this time.  Pt educated on use of call light and to call out with needs, verbalized understanding, bed in low locked position for pt safety

## 2023-01-16 NOTE — PLAN OF CARE
Problem: Discharge Planning  Goal: Discharge to home or other facility with appropriate resources  1/16/2023 1353 by Chrissy Orellana RN  Outcome: Progressing     Problem: Safety - Adult  Goal: Free from fall injury  1/16/2023 1353 by Chrissy Orellana RN  Outcome: Progressing     Problem: Discharge Planning  Goal: Discharge to home or other facility with appropriate resources  1/16/2023 1353 by Chrissy Orellana RN  Outcome: Progressing     Problem: Safety - Adult  Goal: Free from fall injury  1/16/2023 1353 by Chrissy Orellana RN  Outcome: Progressing     Problem: Skin/Tissue Integrity  Goal: Absence of new skin breakdown  Description: 1. Monitor for areas of redness and/or skin breakdown  2. Assess vascular access sites hourly  3. Every 4-6 hours minimum:  Change oxygen saturation probe site  4. Every 4-6 hours:  If on nasal continuous positive airway pressure, respiratory therapy assess nares and determine need for appliance change or resting period.   1/16/2023 1353 by Chrissy Orellana RN  Outcome: Progressing     Problem: Pain  Goal: Verbalizes/displays adequate comfort level or baseline comfort level  1/16/2023 1353 by Chrissy Orellana RN  Outcome: Progressing     Problem: Respiratory - Adult  Goal: Achieves optimal ventilation and oxygenation  1/16/2023 1353 by Chrissy Orellana RN  Outcome: Progressing     Problem: Cardiovascular - Adult  Goal: Maintains optimal cardiac output and hemodynamic stability  1/16/2023 1353 by Chrissy Orellana RN  Outcome: Progressing     Problem: Skin/Tissue Integrity - Adult  Goal: Skin integrity remains intact  1/16/2023 1353 by Chrissy Orellana RN  Outcome: Progressing     Problem: Musculoskeletal - Adult  Goal: Return mobility to safest level of function  1/16/2023 1353 by Chrissy Orellana RN  Outcome: Progressing

## 2023-01-16 NOTE — CARE COORDINATION
INTERDISCIPLINARY PLAN OF CARE CONFERENCE    Date/Time: 1/16/2023 3:00 PM  Completed by: Ronnie Flores RN, Case Management      Patient Name:  Ladonna Duenas  YOB: 1940  Admitting Diagnosis: Acute hypoxemic respiratory failure ([facility] [address] [J96.01]  Pneumonia, unspecified organism [J18.9]     Admit Date/Time:  1/13/2023  1:57 AM    Chart reviewed. Interdisciplinary team contacted or reviewed plan related to patient progress and discharge plans. Disciplines included Case Management, Nursing, and Dietitian. Current Status:Stable  PT/OT recommendation for discharge plan of care:  home with initial 24/7 supervision and home PT    Expected D/C Disposition:  Home  Confirmed plan with patient and/or family Yes confirmed with: (name) rainer  Met with:patient  Discharge Plan Comments: Reviewed chart and met with pt and rainer at bedside. Plan remains for return home at [facility].  Will follow for poss HHC/O2    Home O2 in place on admit: No  Pt informed of need to bring portable home O2 tank on day of discharge for nursing to connect prior to leaving:  Not Indicated  Verbalized agreement/Understanding:  Not Indicated

## 2023-01-16 NOTE — PROGRESS NOTES
Pulmonary Progress Note  CC: COPD, flu    Subjective:  still requiring O2      EXAM: /74   Pulse (!) 102   Temp 97.9 °F (36.6 °C) (Oral)   Resp 18   Ht 5' 7\" (1.702 m)   Wt 157 lb (71.2 kg)   SpO2 95%   BMI 24.59 kg/m²  on 3L  Constitutional:  No acute distress. Eyes: PERRL. Conjunctivae anicteric. ENT: Normal nose. Normal tongue. Neck:  Trachea is midline. No thyroid tenderness. Respiratory: No accessory muscle usage. No wheezes. No rales. + Rhonchi. Cardiovascular: Normal S1S2. No digit clubbing. No digit cyanosis. No LE edema. Psychiatric: No anxiety or Agitation. Alert and Oriented to person, place and time. Scheduled Meds:   azithromycin  250 mg Oral Daily    predniSONE  40 mg Oral Daily    ipratropium-albuterol  1 ampule Inhalation TID    sodium chloride flush  5-40 mL IntraVENous 2 times per day    enoxaparin  40 mg SubCUTAneous Daily    aspirin  325 mg Oral Daily    atorvastatin  20 mg Oral Nightly    cilostazol  50 mg Oral BID    ezetimibe  10 mg Oral Daily    folic acid  1 mg Oral Daily    [Held by provider] hydroCHLOROthiazide  12.5 mg Oral Daily    levothyroxine  50 mcg Oral Daily    losartan  100 mg Oral Daily    metoprolol succinate  100 mg Oral Daily    oxybutynin  10 mg Oral Daily    oseltamivir  75 mg Oral BID     Continuous Infusions:   sodium chloride Stopped (01/13/23 1913)     PRN Meds:  sodium chloride flush, sodium chloride, ondansetron **OR** ondansetron, polyethylene glycol, acetaminophen **OR** acetaminophen, potassium chloride **OR** potassium alternative oral replacement **OR** potassium chloride, magnesium sulfate, albuterol sulfate HFA    Labs:  CBC:   Recent Labs     01/14/23  0535   WBC 8.9   HGB 11.7*   HCT 35.1*   MCV 90.7   *     BMP:   Recent Labs     01/14/23  0535 01/15/23  0537 01/16/23  0529   * 131* 130*   K 3.4* 3.7 3.7   CL 95* 96* 95*   CO2 24 26 26   BUN 15 13 15   CREATININE 0.7 0.8 0.8     CTPA 1/13/23:   1.  No evidence of acute pulmonary embolism or acute aortic disease. No   evidence of aortic aneurysm or dissection. 2. Heart, pericardium and mediastinum appear stable. No evidence of   lymphadenopathy. 3. Moderate emphysema. Bilateral lower lobe atelectatic changes but no   consolidative infiltrates. Increased interstitial markings may indicate mild   interstitial edema. 4. Incidental cholelithiasis without inflammation of the bile duct. 5. Incidental bilateral adrenal nodules. ASSESSMENT:  Acute hypoxic respiratory failure   Influenza A   Pulmonary emphysema noted on Chest CT     PLAN:  Supplemental oxygen to maintain SaO2 >92%; wean as tolerated  Intensive inhaled bronchodilator therapy.   Prednisone taper  Azithromycin  Tamiflu  Outpatient PFTs

## 2023-01-17 PROCEDURE — 6360000002 HC RX W HCPCS: Performed by: HOSPITALIST

## 2023-01-17 PROCEDURE — 97530 THERAPEUTIC ACTIVITIES: CPT

## 2023-01-17 PROCEDURE — 97535 SELF CARE MNGMENT TRAINING: CPT

## 2023-01-17 PROCEDURE — 99233 SBSQ HOSP IP/OBS HIGH 50: CPT | Performed by: INTERNAL MEDICINE

## 2023-01-17 PROCEDURE — 6370000000 HC RX 637 (ALT 250 FOR IP): Performed by: HOSPITALIST

## 2023-01-17 PROCEDURE — 1200000000 HC SEMI PRIVATE

## 2023-01-17 PROCEDURE — 2700000000 HC OXYGEN THERAPY PER DAY

## 2023-01-17 PROCEDURE — 6370000000 HC RX 637 (ALT 250 FOR IP)

## 2023-01-17 PROCEDURE — 94669 MECHANICAL CHEST WALL OSCILL: CPT

## 2023-01-17 PROCEDURE — 94640 AIRWAY INHALATION TREATMENT: CPT

## 2023-01-17 PROCEDURE — 6370000000 HC RX 637 (ALT 250 FOR IP): Performed by: INTERNAL MEDICINE

## 2023-01-17 PROCEDURE — 94761 N-INVAS EAR/PLS OXIMETRY MLT: CPT

## 2023-01-17 PROCEDURE — 99232 SBSQ HOSP IP/OBS MODERATE 35: CPT | Performed by: INTERNAL MEDICINE

## 2023-01-17 PROCEDURE — 2580000003 HC RX 258: Performed by: HOSPITALIST

## 2023-01-17 RX ADMIN — SODIUM CHLORIDE, PRESERVATIVE FREE 10 ML: 5 INJECTION INTRAVENOUS at 10:02

## 2023-01-17 RX ADMIN — EZETIMIBE 10 MG: 10 TABLET ORAL at 10:01

## 2023-01-17 RX ADMIN — CILOSTAZOL 50 MG: 100 TABLET ORAL at 23:23

## 2023-01-17 RX ADMIN — METOPROLOL SUCCINATE 100 MG: 50 TABLET, EXTENDED RELEASE ORAL at 10:01

## 2023-01-17 RX ADMIN — IPRATROPIUM BROMIDE AND ALBUTEROL SULFATE 1 AMPULE: 2.5; .5 SOLUTION RESPIRATORY (INHALATION) at 07:42

## 2023-01-17 RX ADMIN — OSELTAMIVIR PHOSPHATE 75 MG: 75 CAPSULE ORAL at 10:01

## 2023-01-17 RX ADMIN — FOLIC ACID 1 MG: 1 TABLET ORAL at 10:00

## 2023-01-17 RX ADMIN — ASPIRIN 325 MG: 325 TABLET, COATED ORAL at 10:01

## 2023-01-17 RX ADMIN — OSELTAMIVIR PHOSPHATE 75 MG: 75 CAPSULE ORAL at 23:21

## 2023-01-17 RX ADMIN — LOSARTAN POTASSIUM 100 MG: 100 TABLET, FILM COATED ORAL at 23:21

## 2023-01-17 RX ADMIN — ENOXAPARIN SODIUM 40 MG: 100 INJECTION SUBCUTANEOUS at 10:01

## 2023-01-17 RX ADMIN — LEVOTHYROXINE SODIUM 50 MCG: 50 TABLET ORAL at 10:01

## 2023-01-17 RX ADMIN — IPRATROPIUM BROMIDE AND ALBUTEROL SULFATE 1 AMPULE: 2.5; .5 SOLUTION RESPIRATORY (INHALATION) at 19:21

## 2023-01-17 RX ADMIN — OXYBUTYNIN CHLORIDE 10 MG: 5 TABLET, EXTENDED RELEASE ORAL at 10:00

## 2023-01-17 RX ADMIN — ATORVASTATIN CALCIUM 20 MG: 10 TABLET, FILM COATED ORAL at 23:22

## 2023-01-17 RX ADMIN — CILOSTAZOL 50 MG: 100 TABLET ORAL at 10:04

## 2023-01-17 RX ADMIN — SODIUM CHLORIDE, PRESERVATIVE FREE 10 ML: 5 INJECTION INTRAVENOUS at 23:24

## 2023-01-17 RX ADMIN — PREDNISONE 40 MG: 20 TABLET ORAL at 10:01

## 2023-01-17 RX ADMIN — AZITHROMYCIN MONOHYDRATE 250 MG: 250 TABLET ORAL at 10:01

## 2023-01-17 NOTE — PROGRESS NOTES
RT Inhaler-Nebulizer Bronchodilator Protocol Note    There is a bronchodilator order in the chart from a provider indicating to follow the RT Bronchodilator Protocol and there is an Initiate RT Inhaler-Nebulizer Bronchodilator Protocol order as well (see protocol at bottom of note). CXR Findings:  XR CHEST PORTABLE    Result Date: 1/15/2023  1. No acute abnormality. The findings from the last RT Protocol Assessment were as follows:   History Pulmonary Disease: Chronic pulmonary disease  Respiratory Pattern: Regular pattern and RR 12-20 bpm  Breath Sounds: Inspiratory and expiratory or bilateral wheezing and/or rhonchi  Cough: Strong, productive  Indication for Bronchodilator Therapy: Decreased or absent breath sounds  Bronchodilator Assessment Score: 9    Aerosolized bronchodilator medication orders have been revised according to the RT Inhaler-Nebulizer Bronchodilator Protocol below. Respiratory Therapist to perform RT Therapy Protocol Assessment initially then follow the protocol. Repeat RT Therapy Protocol Assessment PRN for score 0-3 or on second treatment, BID, and PRN for scores above 3. No Indications - adjust the frequency to every 6 hours PRN wheezing or bronchospasm, if no treatments needed after 48 hours then discontinue using Per Protocol order mode. If indication present, adjust the RT bronchodilator orders based on the Bronchodilator Assessment Score as indicated below. Use Inhaler orders unless patient has one or more of the following: on home nebulizer, not able to hold breath for 10 seconds, is not alert and oriented, cannot activate and use MDI correctly, or respiratory rate 25 breaths per minute or more, then use the equivalent nebulizer order(s) with same Frequency and PRN reasons based on the score. If a patient is on this medication at home then do not decrease Frequency below that used at home.     0-3 - enter or revise RT bronchodilator order(s) to equivalent RT Bronchodilator order with Frequency of every 4 hours PRN for wheezing or increased work of breathing using Per Protocol order mode. 4-6 - enter or revise RT Bronchodilator order(s) to two equivalent RT bronchodilator orders with one order with BID Frequency and one order with Frequency of every 4 hours PRN wheezing or increased work of breathing using Per Protocol order mode. 7-10 - enter or revise RT Bronchodilator order(s) to two equivalent RT bronchodilator orders with one order with TID Frequency and one order with Frequency of every 4 hours PRN wheezing or increased work of breathing using Per Protocol order mode. 11-13 - enter or revise RT Bronchodilator order(s) to one equivalent RT bronchodilator order with QID Frequency and an Albuterol order with Frequency of every 4 hours PRN wheezing or increased work of breathing using Per Protocol order mode. Greater than 13 - enter or revise RT Bronchodilator order(s) to one equivalent RT bronchodilator order with every 4 hours Frequency and an Albuterol order with Frequency of every 2 hours PRN wheezing or increased work of breathing using Per Protocol order mode. RT to enter RT Home Evaluation for COPD & MDI Assessment order using Per Protocol order mode.     Electronically signed by Nicole Max RCP on 1/17/2023 at 7:44 AM

## 2023-01-17 NOTE — PROGRESS NOTES
Progress Note    Admit Date:  1/13/2023    Admitted with influenza A and acute hypoxic respiratory failure    Subjective:  Ms. Patrick All seen . Cough and SOB have improved. Patient feels much better today, sitting up on the chair. Still requiring oxygen 4 L at rest and 5 L with activity. She was initially on 7 L. She is hoping to go home soon. .  Discussed with patient -we need to wean her oxygen further closer to 2 L to set her up with home oxygen on discharge. Patient was not on home oxygen prior to admission. Objective:   Patient Vitals for the past 4 hrs:   BP Temp Temp src Pulse Resp SpO2   01/17/23 0954 125/80 97.3 °F (36.3 °C) Oral 99 16 92 %          No intake or output data in the 24 hours ending 01/17/23 1348      Physical Exam:  Gen: No distress. Alert. Awake and well-oriented  Eyes: PERRL. No sclera icterus. No conjunctival injection. Neck: No JVD. Trachea midline. Resp: No accessory muscle use. No crackles. No wheezes. + Mild rhonchi. CV: Regular rate. Regular rhythm. No murmur. No rub. No edema. Peripheral Pulses: +2 palpable, equal bilaterally   GI: Non-tender. Non-distended. Normal bowel sounds. Skin: Warm and dry. No nodule on exposed extremities. No rash on exposed extremities. M/S: No cyanosis. No joint deformity. No clubbing. Neuro: Awake. Grossly nonfocal    Psych: Oriented x 3. No anxiety or agitation.        Scheduled Meds:   azithromycin  250 mg Oral Daily    predniSONE  40 mg Oral Daily    ipratropium-albuterol  1 ampule Inhalation TID    sodium chloride flush  5-40 mL IntraVENous 2 times per day    enoxaparin  40 mg SubCUTAneous Daily    aspirin  325 mg Oral Daily    atorvastatin  20 mg Oral Nightly    cilostazol  50 mg Oral BID    ezetimibe  10 mg Oral Daily    folic acid  1 mg Oral Daily    [Held by provider] hydroCHLOROthiazide  12.5 mg Oral Daily    levothyroxine  50 mcg Oral Daily    losartan  100 mg Oral Daily    metoprolol succinate  100 mg Oral Daily oxybutynin  10 mg Oral Daily    oseltamivir  75 mg Oral BID       Continuous Infusions:   sodium chloride Stopped (01/13/23 1913)       PRN Meds:  sodium chloride flush, sodium chloride, ondansetron **OR** ondansetron, polyethylene glycol, acetaminophen **OR** acetaminophen, potassium chloride **OR** potassium alternative oral replacement **OR** potassium chloride, magnesium sulfate, albuterol sulfate HFA      Data:  CBC:   No results for input(s): WBC, HGB, HCT, MCV, PLT in the last 72 hours. BMP:   Recent Labs     01/15/23  0537 01/16/23  0529   * 130*   K 3.7 3.7   CL 96* 95*   CO2 26 26   BUN 13 15   CREATININE 0.8 0.8         CULTURES  Covid not detected  Influenza A detected  Legionella not detected  Strep pneumoniae not detected      RADIOLOGY  XR CHEST PORTABLE   Final Result   1. No acute abnormality. CTA PULMONARY W CONTRAST   Final Result   1. No evidence of acute pulmonary embolism or acute aortic disease. No   evidence of aortic aneurysm or dissection. 2. Heart, pericardium and mediastinum appear stable. No evidence of   lymphadenopathy. 3. Moderate emphysema. Bilateral lower lobe atelectatic changes but no   consolidative infiltrates. Increased interstitial markings may indicate mild   interstitial edema. 4. Incidental cholelithiasis without inflammation of the bile duct. 5. Incidental bilateral adrenal nodules. XR CHEST PORTABLE   Final Result   Mild bibasilar airspace opacities favored to represent atelectasis.              Assessment/Plan:  #Acute respiratory failure with hypoxia  #COPD AE  #Influenza A  -no home O2 at baseline  -requiring up to 7L --> 3L-> 4L at rest and 5 L with activity;  continue to wean  -CTA shows no PE, no pneumonia, significant for emphysema, mild interstitial edema  -procal negative  -legionella and strep negative   -continue IBD  -prednisone taper   -tamiflu  -zithromax and rocephin --> zithromax po  -droplet isolation   -lasix 20 mg IV x 1 given  -pulmonology following   -Encouraged Acapella    #Hyponatremia  -1500 ml FR   -continue      #Fall x2   #Generalized weakness  -no acute injury other than bruise on right buttock  -PT/OT     #Hx of CVA with residual left sided weakness  -supportive care  -uses rollator at home and hemiwalker in public at baseline  -continue ASA and statin     #Hypokalemia   -replace     #Thrombocytopenia  -mild  -monitor on lovenox     #CAD  #PVD  #Bilateral carotid artery stenosis  -continue pletal, ASA and statin     #HTN   -BP elevated  -continue home toprol xl, cozaar and hctz     #HLD  -continue statin and zetia     #Hypothyroidism  -continue synthroid     #OAB  -continue oxybutynin     DVT Prophylaxis: Lovenox  Diet: ADULT DIET;  Regular; 1500 ml  Code Status: Full Code    DC plans for home with home O2 -Tomorrow,   Discussed with patient face-to-face need for home O2    Jayesh Carranza MD  1/17/2023  1:48 PM

## 2023-01-17 NOTE — PROGRESS NOTES
Shift assessment complete; see flow sheet. Scheduled medications administered; See MAR. IV flushed without difficulty. O2 3 LPM nc in place. Pt denies pain at this time. Pt denies any needs at this time.  Pt educated on use of call light and to call out with needs, verbalized understanding, bed in low locked position, bed alarm on for pt safety

## 2023-01-17 NOTE — PROGRESS NOTES
Bedside report and transfer of care given to [facility]. Pt currently resting in bed with the call light within reach. Pt denies any other care needs at this time. Pt stable at this time.     Barron Donis RN

## 2023-01-17 NOTE — PLAN OF CARE
Problem: Discharge Planning  Goal: Discharge to home or other facility with appropriate resources  1/17/2023 0315 by Sal Mendoza RN  Outcome: Progressing  1/16/2023 1353 by Ruben Christopher RN  Outcome: Progressing     Problem: Safety - Adult  Goal: Free from fall injury  1/17/2023 0315 by Sal Mendoza RN  Outcome: Blanca Locker  1/16/2023 1353 by Ruben Christopher RN  Outcome: Progressing     Problem: Skin/Tissue Integrity  Goal: Absence of new skin breakdown  Description: 1. Monitor for areas of redness and/or skin breakdown  2. Assess vascular access sites hourly  3. Every 4-6 hours minimum:  Change oxygen saturation probe site  4. Every 4-6 hours:  If on nasal continuous positive airway pressure, respiratory therapy assess nares and determine need for appliance change or resting period.   1/17/2023 0315 by Sal Mendoza RN  Outcome: Progressing  1/16/2023 1353 by Ruben Christopher RN  Outcome: Progressing     Problem: Pain  Goal: Verbalizes/displays adequate comfort level or baseline comfort level  1/17/2023 0315 by Sal Mendoza RN  Outcome: Progressing  1/16/2023 1353 by Ruben Christopher RN  Outcome: Progressing     Problem: Respiratory - Adult  Goal: Achieves optimal ventilation and oxygenation  1/17/2023 0315 by Sal Mendoza RN  Outcome: Progressing  1/16/2023 1353 by Ruben Christopher RN  Outcome: Progressing     Problem: Cardiovascular - Adult  Goal: Maintains optimal cardiac output and hemodynamic stability  1/17/2023 0315 by Sal Mendoza RN  Outcome: Progressing  1/16/2023 1353 by Ruben Christopher RN  Outcome: Progressing     Problem: Skin/Tissue Integrity - Adult  Goal: Skin integrity remains intact  1/17/2023 0315 by Sal Mendoza RN  Outcome: Progressing  1/16/2023 1353 by Ruben Christopher RN  Outcome: Progressing     Problem: Musculoskeletal - Adult  Goal: Return mobility to safest level of function  1/17/2023 0315 by Sal Mendoza RN  Outcome: Progressing  1/16/2023 1353 by Ruben Christopher RN  Outcome: Progressing

## 2023-01-17 NOTE — PROGRESS NOTES
RT Inhaler-Nebulizer Bronchodilator Protocol Note    There is a bronchodilator order in the chart from a provider indicating to follow the RT Bronchodilator Protocol and there is an Initiate RT Inhaler-Nebulizer Bronchodilator Protocol order as well (see protocol at bottom of note). CXR Findings:  XR CHEST PORTABLE    Result Date: 1/15/2023  1. No acute abnormality. The findings from the last RT Protocol Assessment were as follows:   History Pulmonary Disease: Chronic pulmonary disease  Respiratory Pattern: Regular pattern and RR 12-20 bpm  Breath Sounds: Inspiratory and expiratory or bilateral wheezing and/or rhonchi  Cough: Strong, spontaneous, non-productive  Indication for Bronchodilator Therapy: Decreased or absent breath sounds  Bronchodilator Assessment Score: 8    Aerosolized bronchodilator medication orders have been revised according to the RT Inhaler-Nebulizer Bronchodilator Protocol below. Respiratory Therapist to perform RT Therapy Protocol Assessment initially then follow the protocol. Repeat RT Therapy Protocol Assessment PRN for score 0-3 or on second treatment, BID, and PRN for scores above 3. No Indications - adjust the frequency to every 6 hours PRN wheezing or bronchospasm, if no treatments needed after 48 hours then discontinue using Per Protocol order mode. If indication present, adjust the RT bronchodilator orders based on the Bronchodilator Assessment Score as indicated below. Use Inhaler orders unless patient has one or more of the following: on home nebulizer, not able to hold breath for 10 seconds, is not alert and oriented, cannot activate and use MDI correctly, or respiratory rate 25 breaths per minute or more, then use the equivalent nebulizer order(s) with same Frequency and PRN reasons based on the score. If a patient is on this medication at home then do not decrease Frequency below that used at home.     0-3 - enter or revise RT bronchodilator order(s) to equivalent RT Bronchodilator order with Frequency of every 4 hours PRN for wheezing or increased work of breathing using Per Protocol order mode. 4-6 - enter or revise RT Bronchodilator order(s) to two equivalent RT bronchodilator orders with one order with BID Frequency and one order with Frequency of every 4 hours PRN wheezing or increased work of breathing using Per Protocol order mode. 7-10 - enter or revise RT Bronchodilator order(s) to two equivalent RT bronchodilator orders with one order with TID Frequency and one order with Frequency of every 4 hours PRN wheezing or increased work of breathing using Per Protocol order mode. 11-13 - enter or revise RT Bronchodilator order(s) to one equivalent RT bronchodilator order with QID Frequency and an Albuterol order with Frequency of every 4 hours PRN wheezing or increased work of breathing using Per Protocol order mode. Greater than 13 - enter or revise RT Bronchodilator order(s) to one equivalent RT bronchodilator order with every 4 hours Frequency and an Albuterol order with Frequency of every 2 hours PRN wheezing or increased work of breathing using Per Protocol order mode.        Electronically signed by Jeferson Charles RCP on 1/16/2023 at 7:21 PM

## 2023-01-17 NOTE — PROGRESS NOTES
Spo2 96 % on 4L O2 sitting  Spo2 93 % with exertion on 4 L of oxyen  Spo2 78 % while ambulating on 4 L of oxygen   Spo2 92% while ambulating on 5L of oxygen.

## 2023-01-17 NOTE — PROGRESS NOTES
Pulmonary Progress Note  CC: COPD, flu    Subjective:  Pt would like to go home. Still hypoxic. EXAM: /62   Pulse 66   Temp 98.6 °F (37 °C) (Oral)   Resp 16   Ht 5' 7\" (1.702 m)   Wt 157 lb (71.2 kg)   SpO2 94%   BMI 24.59 kg/m²  on 4L  Constitutional:  No acute distress. Eyes: PERRL. Conjunctivae anicteric. ENT: Normal nose. Normal tongue. Neck:  Trachea is midline. No thyroid tenderness. Respiratory: No accessory muscle usage. No wheezes. No rales. + Rhonchi. Cardiovascular: Normal S1S2. No digit clubbing. No digit cyanosis. No LE edema. Psychiatric: No anxiety or Agitation. Alert and Oriented to person, place and time. Scheduled Meds:   azithromycin  250 mg Oral Daily    predniSONE  40 mg Oral Daily    ipratropium-albuterol  1 ampule Inhalation TID    sodium chloride flush  5-40 mL IntraVENous 2 times per day    enoxaparin  40 mg SubCUTAneous Daily    aspirin  325 mg Oral Daily    atorvastatin  20 mg Oral Nightly    cilostazol  50 mg Oral BID    ezetimibe  10 mg Oral Daily    folic acid  1 mg Oral Daily    [Held by provider] hydroCHLOROthiazide  12.5 mg Oral Daily    levothyroxine  50 mcg Oral Daily    losartan  100 mg Oral Daily    metoprolol succinate  100 mg Oral Daily    oxybutynin  10 mg Oral Daily    oseltamivir  75 mg Oral BID     Continuous Infusions:   sodium chloride Stopped (01/13/23 1913)     PRN Meds:  sodium chloride flush, sodium chloride, ondansetron **OR** ondansetron, polyethylene glycol, acetaminophen **OR** acetaminophen, potassium chloride **OR** potassium alternative oral replacement **OR** potassium chloride, magnesium sulfate, albuterol sulfate HFA    Labs:  CBC:   No results for input(s): WBC, HGB, HCT, MCV, PLT in the last 72 hours. BMP:   Recent Labs     01/15/23  0537 01/16/23  0529   * 130*   K 3.7 3.7   CL 96* 95*   CO2 26 26   BUN 13 15   CREATININE 0.8 0.8       CTPA 1/13/23:   1.  No evidence of acute pulmonary embolism or acute aortic disease. No   evidence of aortic aneurysm or dissection. 2. Heart, pericardium and mediastinum appear stable. No evidence of   lymphadenopathy. 3. Moderate emphysema. Bilateral lower lobe atelectatic changes but no   consolidative infiltrates. Increased interstitial markings may indicate mild   interstitial edema. 4. Incidental cholelithiasis without inflammation of the bile duct. 5. Incidental bilateral adrenal nodules. ASSESSMENT:  Acute hypoxic respiratory failure   Influenza A   Pulmonary emphysema noted on Chest CT     PLAN:  Supplemental oxygen to maintain SaO2 >92%; wean as tolerated  Intensive inhaled bronchodilator therapy.   Prednisone taper  Azithromycin  Tamiflu  Outpatient PFTs  Jr ortega for d/c planning on home O2. illl need to check Spot on ambulation first.

## 2023-01-17 NOTE — PROGRESS NOTES
Handoff report and transfer of care given at bedside to [facility]. Patient in stable condition, denies needs/concerns at this time. Call light within reach.

## 2023-01-17 NOTE — PROGRESS NOTES
Occupational Therapy Daily Treatment Note    Unit: [address] [place]  Date:  1/17/2023  Patient Name:    Caretha Goldmann  Admitting diagnosis:  Acute hypoxemic respiratory failure ([address] [J96.01]  Pneumonia, unspecified organism [J18.9]  Admit Date:  1/13/2023  Precautions/Restrictions:    : fall risk, bed/chair alarm, purewick catheter, supplemental O2 (4L), telemetry, continuous pulse ox, and droplet precautions, AFO L foot. Watch BP      Discharge Recommendations: Home PRN assist   DME needs for discharge: RW       Therapy recommendations for staff:   Assist of 1 with use of rolling walker (RW) for all ambulation within room    AM-PAC Score: AM-PAC Inpatient Daily Activity Raw Score: 19  Home Health S4 Level: Level 1- Standard       Treatment Time:  8749-5106   Treatment number:  2    Timed code treatment minutes: 45 minutes  Total treatment minutes:   45 minutes    History of Present Illness:    Per H&P  \"The patient is a 78 y.o. female with CAD, PVD, bilateral carotid artery stenosis, HTN, HLD, OAB and hx of CVA with residual left sided weakness who presents [facility] as transfer from [facility] ED with complaint of fall x2. Patient states that for the last 2 weeks she has experienced worsening cough and CASTANEDA. She states she has had pneumonia before and thought that's what it was. She lives alone and fell twice this morning. She states her legs became very weak and she fell to her butt. She called her daughter who took her to her PCP and PCP recommended ED evaluation. Patient has bruise to left buttock but otherwise denies injury or pain from her fall. Denies lightheadedness, dizziness, LOC and did not hit her head. She is not on AC. Denies fevers, chills, chest pain, palpitations, abd pain, n/v/c/d, numbness, tingling or swelling of her extremities.    Subjective:  pt in the recliner and willing to work with OT     Pain   No  Rating: NA  Location:NA  Pain Medicine Status: No request made      Bed Mobility:   Supine to Sit: Not Tested  Sit to Supine:  Not Tested  Rolling:           Not Tested  Scooting:        Independent    Transfer Training:   Sit to stand:   SBA  Stand to sit:  SBA  Bed to Chair:  CGA with RW   Bed to [facility]:   Not Tested  Standard toilet:   Not Tested    Activity Tolerance   Pt completed therapy session with min SOB   Sitting in chair 85-92%    After ambulation 90-94%  on 4 liters   After  LE ADLs 92%'  After ambulation 94%    ADL Training:   Upper body dressing: Not Tested  Upper body bathing:  Not Tested  Lower body dressing:  SBA for donning and doffing socks and shoes  Lower body bathing:  Not Tested  Toileting:   Not Tested  Grooming/Hygiene:  Not Tested    Therapeutic Exercise: NA  N/A    Patient Education:   Role of OT  Energy conservation techniques  Safe RW use/hand placement  Oxygen tubing management  Pursed lip breathing   Positioning Needs:   Pt up in chair, alarm set, positioned in proper neutral alignment and pressure relief provided. Family Present:  No    Assessment:   Pt was SBA for sit to stand and CGA with RW for transfers and ambulation with gait belt. The pt was  SBA for donning and doffing socks and shoes. The pt instructed in energy conservation and pursed lip breathing. The pts SP02 improved with activity vs at rest. Pt on 4 liters through out OT treatment. GOALS    To be met in 3 Visits:  1). Bed to toilet/BSC: SBA     To be met in 5 Visits:  1). Supine to/from Sit:             Independent  2). Upper Body Bathing:         Supervision  3). Lower Body Bathing:         Supervision  4). Upper Body Dressing:       Supervision  5). Lower Body Dressing:       Supervision  6).  Pt to demonstrate UE exs x 15 reps with minimal cues    Plan: cont with POC      Junior Garcia OTR/L [id number]       If patient discharges from this facility prior to next visit, this note will serve as the Discharge Summary

## 2023-01-18 VITALS
HEART RATE: 85 BPM | BODY MASS INDEX: 24.64 KG/M2 | RESPIRATION RATE: 16 BRPM | HEIGHT: 67 IN | WEIGHT: 157 LBS | DIASTOLIC BLOOD PRESSURE: 63 MMHG | SYSTOLIC BLOOD PRESSURE: 138 MMHG | OXYGEN SATURATION: 99 % | TEMPERATURE: 97.7 F

## 2023-01-18 PROCEDURE — 2580000003 HC RX 258: Performed by: HOSPITALIST

## 2023-01-18 PROCEDURE — 6370000000 HC RX 637 (ALT 250 FOR IP)

## 2023-01-18 PROCEDURE — 6370000000 HC RX 637 (ALT 250 FOR IP): Performed by: INTERNAL MEDICINE

## 2023-01-18 PROCEDURE — 99233 SBSQ HOSP IP/OBS HIGH 50: CPT | Performed by: INTERNAL MEDICINE

## 2023-01-18 PROCEDURE — 6370000000 HC RX 637 (ALT 250 FOR IP): Performed by: HOSPITALIST

## 2023-01-18 PROCEDURE — 99239 HOSP IP/OBS DSCHRG MGMT >30: CPT | Performed by: INTERNAL MEDICINE

## 2023-01-18 PROCEDURE — 97530 THERAPEUTIC ACTIVITIES: CPT

## 2023-01-18 PROCEDURE — 2700000000 HC OXYGEN THERAPY PER DAY

## 2023-01-18 PROCEDURE — 6360000002 HC RX W HCPCS: Performed by: HOSPITALIST

## 2023-01-18 PROCEDURE — 94761 N-INVAS EAR/PLS OXIMETRY MLT: CPT

## 2023-01-18 PROCEDURE — 94640 AIRWAY INHALATION TREATMENT: CPT

## 2023-01-18 PROCEDURE — 94669 MECHANICAL CHEST WALL OSCILL: CPT

## 2023-01-18 PROCEDURE — 97116 GAIT TRAINING THERAPY: CPT

## 2023-01-18 RX ORDER — PREDNISONE 10 MG/1
TABLET ORAL
Qty: 18 TABLET | Refills: 0 | Status: SHIPPED | OUTPATIENT
Start: 2023-01-18

## 2023-01-18 RX ORDER — IPRATROPIUM BROMIDE AND ALBUTEROL SULFATE 2.5; .5 MG/3ML; MG/3ML
3 SOLUTION RESPIRATORY (INHALATION) 3 TIMES DAILY
Qty: 360 ML | Refills: 0 | Status: SHIPPED | OUTPATIENT
Start: 2023-01-18

## 2023-01-18 RX ADMIN — IPRATROPIUM BROMIDE AND ALBUTEROL SULFATE 1 AMPULE: 2.5; .5 SOLUTION RESPIRATORY (INHALATION) at 07:42

## 2023-01-18 RX ADMIN — AZITHROMYCIN MONOHYDRATE 250 MG: 250 TABLET ORAL at 08:49

## 2023-01-18 RX ADMIN — ASPIRIN 325 MG: 325 TABLET, COATED ORAL at 08:50

## 2023-01-18 RX ADMIN — ENOXAPARIN SODIUM 40 MG: 100 INJECTION SUBCUTANEOUS at 08:49

## 2023-01-18 RX ADMIN — FOLIC ACID 1 MG: 1 TABLET ORAL at 08:50

## 2023-01-18 RX ADMIN — METOPROLOL SUCCINATE 100 MG: 50 TABLET, EXTENDED RELEASE ORAL at 08:49

## 2023-01-18 RX ADMIN — OXYBUTYNIN CHLORIDE 10 MG: 5 TABLET, EXTENDED RELEASE ORAL at 08:49

## 2023-01-18 RX ADMIN — CILOSTAZOL 50 MG: 100 TABLET ORAL at 08:50

## 2023-01-18 RX ADMIN — LEVOTHYROXINE SODIUM 50 MCG: 50 TABLET ORAL at 08:50

## 2023-01-18 RX ADMIN — LOSARTAN POTASSIUM 100 MG: 100 TABLET, FILM COATED ORAL at 08:49

## 2023-01-18 RX ADMIN — SODIUM CHLORIDE, PRESERVATIVE FREE 10 ML: 5 INJECTION INTRAVENOUS at 08:50

## 2023-01-18 RX ADMIN — PREDNISONE 40 MG: 20 TABLET ORAL at 08:49

## 2023-01-18 RX ADMIN — EZETIMIBE 10 MG: 10 TABLET ORAL at 08:50

## 2023-01-18 NOTE — PROGRESS NOTES
RT Inhaler-Nebulizer Bronchodilator Protocol Note    There is a bronchodilator order in the chart from a provider indicating to follow the RT Bronchodilator Protocol and there is an Initiate RT Inhaler-Nebulizer Bronchodilator Protocol order as well (see protocol at bottom of note). CXR Findings:  No results found. The findings from the last RT Protocol Assessment were as follows:   History Pulmonary Disease: Chronic pulmonary disease  Respiratory Pattern: Regular pattern and RR 12-20 bpm  Breath Sounds: Inspiratory and expiratory or bilateral wheezing and/or rhonchi  Cough: Strong, spontaneous, non-productive  Indication for Bronchodilator Therapy: Wheezing associated with pulm disorder  Bronchodilator Assessment Score: 8    Aerosolized bronchodilator medication orders have been revised according to the RT Inhaler-Nebulizer Bronchodilator Protocol below. Respiratory Therapist to perform RT Therapy Protocol Assessment initially then follow the protocol. Repeat RT Therapy Protocol Assessment PRN for score 0-3 or on second treatment, BID, and PRN for scores above 3. No Indications - adjust the frequency to every 6 hours PRN wheezing or bronchospasm, if no treatments needed after 48 hours then discontinue using Per Protocol order mode. If indication present, adjust the RT bronchodilator orders based on the Bronchodilator Assessment Score as indicated below. Use Inhaler orders unless patient has one or more of the following: on home nebulizer, not able to hold breath for 10 seconds, is not alert and oriented, cannot activate and use MDI correctly, or respiratory rate 25 breaths per minute or more, then use the equivalent nebulizer order(s) with same Frequency and PRN reasons based on the score. If a patient is on this medication at home then do not decrease Frequency below that used at home.     0-3 - enter or revise RT bronchodilator order(s) to equivalent RT Bronchodilator order with Frequency of every 4 hours PRN for wheezing or increased work of breathing using Per Protocol order mode. 4-6 - enter or revise RT Bronchodilator order(s) to two equivalent RT bronchodilator orders with one order with BID Frequency and one order with Frequency of every 4 hours PRN wheezing or increased work of breathing using Per Protocol order mode. 7-10 - enter or revise RT Bronchodilator order(s) to two equivalent RT bronchodilator orders with one order with TID Frequency and one order with Frequency of every 4 hours PRN wheezing or increased work of breathing using Per Protocol order mode. 11-13 - enter or revise RT Bronchodilator order(s) to one equivalent RT bronchodilator order with QID Frequency and an Albuterol order with Frequency of every 4 hours PRN wheezing or increased work of breathing using Per Protocol order mode. Greater than 13 - enter or revise RT Bronchodilator order(s) to one equivalent RT bronchodilator order with every 4 hours Frequency and an Albuterol order with Frequency of every 2 hours PRN wheezing or increased work of breathing using Per Protocol order mode. RT to enter RT Home Evaluation for COPD & MDI Assessment order using Per Protocol order mode.     Electronically signed by Douglas Page RCP on 1/18/2023 at 7:43 AM

## 2023-01-18 NOTE — DISCHARGE SUMMARY
Name:  Courtney Chen  Room:  0212/0212-01  MRN:    [id number]    Discharge Summary      This discharge summary is in conjunction with a complete physical exam done on the day of discharge. Discharging Physician: Dr. Kaci Forbes MD     Admit: 1/13/2023  Discharge:  1/18/2023     HPI taken from admission H&P:    The patient is a 78 y.o. female with CAD, PVD, bilateral carotid artery stenosis, HTN, HLD, OAB and hx of CVA with residual left sided weakness who presents [facility] as transfer from [facility] ED with complaint of fall x2. Patient states that for the last 2 weeks she has experienced worsening cough and CASTANEDA. She states she has had pneumonia before and thought that's what it was. She lives alone and fell twice this morning. She states her legs became very weak and she fell to her butt. She called her daughter who took her to her PCP and PCP recommended ED evaluation. Patient has bruise to left buttock but otherwise denies injury or pain from her fall. Denies lightheadedness, dizziness, LOC and did not hit her head. She is not on AC. Denies fevers, chills, chest pain, palpitations, abd pain, n/v/c/d, numbness, tingling or swelling of her extremities. Discussed CODE STATUS with the patient who wishes to remain a FULL CODE.      Diagnoses this Admission and Hospital Course   #Acute respiratory failure with hypoxia  #COPD AE  #Influenza A  -no home O2 at baseline  -requiring up to 7L --> 3L-> 4L at rest and 5 L with activity;  continue to wean  -CTA shows no PE, no pneumonia, significant for emphysema, mild interstitial edema  -procal negative  -legionella and strep negative   -continue IBD  -prednisone taper   -tamiflu  -zithromax and rocephin --> zithromax po  -droplet isolation   -lasix 20 mg IV x 1 given  -pulmonology following   -Encouraged Acapella     #Hyponatremia  -1500 ml FR   -continue      #Fall x2   #Generalized weakness  -no acute injury other than bruise on right buttock  -PT/OT     #Hx of CVA with residual left sided weakness  -supportive care  -uses rollator at home and hemiwalker in public at baseline  -continue ASA and statin     #Hypokalemia   -replace     #Thrombocytopenia  -mild  -monitor on lovenox     #CAD  #PVD  #Bilateral carotid artery stenosis  -continue pletal, ASA and statin     #HTN   -BP elevated  -continue home toprol xl, cozaar and hctz     #HLD  -continue statin and zetia     #Hypothyroidism  -continue synthroid     #OAB  -continue oxybutynin      DVT Prophylaxis: Lovenox  Diet: ADULT DIET; Regular; 1500 ml  Code Status: Full Code     DC plans for home with home O2  Discussed with patient face-to-face need for home O2    Procedures (Please Review Full Report for Details)  none    Consults    Pulmonology  Pharmacy       Physical Exam at Discharge:    /63   Pulse 85   Temp 97.7 °F (36.5 °C) (Oral)   Resp 16   Ht 5' 7\" (1.702 m)   Wt 157 lb (71.2 kg)   SpO2 99%   BMI 24.59 kg/m²   Gen: No distress. Alert. Awake and well-oriented  Eyes: PERRL. No sclera icterus. No conjunctival injection. Neck: No JVD. Trachea midline. Resp: No accessory muscle use. No crackles. No wheezes. + Mild rhonchi. CV: Regular rate. Regular rhythm. No murmur. No rub. No edema. Peripheral Pulses: +2 palpable, equal bilaterally   GI: Non-tender. Non-distended. Normal bowel sounds. Skin: Warm and dry. No nodule on exposed extremities. No rash on exposed extremities. M/S: No cyanosis. No joint deformity. No clubbing. Neuro: Awake. Grossly nonfocal    Psych: Oriented x 3. No anxiety or agitation.          Scheduled Meds:  Scheduled Medications    azithromycin  250 mg Oral Daily    predniSONE  40 mg Oral Daily    ipratropium-albuterol  1 ampule Inhalation TID    sodium chloride flush  5-40 mL IntraVENous 2 times per day    enoxaparin  40 mg SubCUTAneous Daily    aspirin  325 mg Oral Daily    atorvastatin  20 mg Oral Nightly    cilostazol  50 mg Oral BID    ezetimibe  10 mg Oral Daily folic acid  1 mg Oral Daily    [Held by provider] hydroCHLOROthiazide  12.5 mg Oral Daily    levothyroxine  50 mcg Oral Daily    losartan  100 mg Oral Daily    metoprolol succinate  100 mg Oral Daily    oxybutynin  10 mg Oral Daily            Continuous Infusions:  Infusions Meds    sodium chloride Stopped (01/13/23 1913)            PRN Meds:  PRN Medications   sodium chloride flush, sodium chloride, ondansetron **OR** ondansetron, polyethylene glycol, acetaminophen **OR** acetaminophen, potassium chloride **OR** potassium alternative oral replacement **OR** potassium chloride, magnesium sulfate, albuterol sulfate HFA           Data:  CBC:   No results for input(s): WBC, HGB, HCT, MCV, PLT in the last 72 hours. BMP:       Recent Labs     01/16/23  0529   *   K 3.7   CL 95*   CO2 26   BUN 15   CREATININE 0.8            CULTURES  Covid not detected  Influenza A detected  Legionella not detected  Strep pneumoniae not detected       RADIOLOGY  XR CHEST PORTABLE   Final Result   1. No acute abnormality. CTA PULMONARY W CONTRAST   Final Result   1. No evidence of acute pulmonary embolism or acute aortic disease. No   evidence of aortic aneurysm or dissection. 2. Heart, pericardium and mediastinum appear stable. No evidence of   lymphadenopathy. 3. Moderate emphysema. Bilateral lower lobe atelectatic changes but no   consolidative infiltrates. Increased interstitial markings may indicate mild   interstitial edema. 4. Incidental cholelithiasis without inflammation of the bile duct. 5. Incidental bilateral adrenal nodules. XR CHEST PORTABLE   Final Result   Mild bibasilar airspace opacities favored to represent atelectasis.        Discharge Medications     Medication List        START taking these medications      ipratropium-albuterol 0.5-2.5 (3) MG/3ML Soln nebulizer solution  Commonly known as: DUONEB  Inhale 3 mLs into the lungs three times daily     predniSONE 10 MG tablet  Commonly known as: DELTASONE  Take 3 tabs a day for 3 days,Then 2 tabs a day for 3 days ,Then 1 tab a day for 3 days. CONTINUE taking these medications      aspirin 325 MG EC tablet     atorvastatin 20 MG tablet  Commonly known as: LIPITOR     cilostazol 50 MG tablet  Commonly known as: PLETAL     coenzyme Q10 100 MG Caps capsule     ezetimibe 10 MG tablet  Commonly known as: ZETIA     folic acid 1 MG tablet  Commonly known as: FOLVITE     hydroCHLOROthiazide 12.5 MG tablet  Commonly known as: HYDRODIURIL     levothyroxine 50 MCG tablet  Commonly known as: SYNTHROID     losartan 100 MG tablet  Commonly known as: COZAAR     metoprolol succinate 100 MG extended release tablet  Commonly known as: TOPROL XL     nitroGLYCERIN 0.4 MG SL tablet  Commonly known as: NITROSTAT     oxybutynin 10 MG extended release tablet  Commonly known as: DITROPAN-XL     Ventolin  (90 Base) MCG/ACT inhaler  Generic drug: albuterol sulfate HFA     vitamin D 25 MCG (1000 UT) Caps               Where to Get Your Medications        These medications were sent to [id number] [facility], [address] [facility],[address]  [address] [address], [address] [address]      Phone: [phone]   ipratropium-albuterol 0.5-2.5 (3) MG/3ML Soln nebulizer solution  predniSONE 10 MG tablet           Discharged in stable condition to home    Follow Up:   Follow up with PCP in 1 week    ***

## 2023-01-18 NOTE — PLAN OF CARE
Problem: Discharge Planning  Goal: Discharge to home or other facility with appropriate resources  1/18/2023 1050 by Helena Young RN  Outcome: Completed     Problem: Safety - Adult  Goal: Free from fall injury  1/18/2023 1050 by Helena Young RN  Outcome: Completed     Problem: Skin/Tissue Integrity  Goal: Absence of new skin breakdown  Description: 1. Monitor for areas of redness and/or skin breakdown  2. Assess vascular access sites hourly  3. Every 4-6 hours minimum:  Change oxygen saturation probe site  4. Every 4-6 hours:  If on nasal continuous positive airway pressure, respiratory therapy assess nares and determine need for appliance change or resting period.   1/18/2023 1050 by Helena Young RN  Outcome: Completed     Problem: Pain  Goal: Verbalizes/displays adequate comfort level or baseline comfort level  1/18/2023 1050 by Helena Young RN  Outcome: Completed     Problem: Respiratory - Adult  Goal: Achieves optimal ventilation and oxygenation  1/18/2023 1050 by Helena Young RN  Outcome: Completed     Problem: Cardiovascular - Adult  Goal: Maintains optimal cardiac output and hemodynamic stability  1/18/2023 1050 by Helena Young RN  Outcome: Completed     Problem: Skin/Tissue Integrity - Adult  Goal: Skin integrity remains intact  1/18/2023 1050 by Helena Young RN  Outcome: Completed     Problem: Musculoskeletal - Adult  Goal: Return mobility to safest level of function  1/18/2023 1050 by Helena Young RN  Outcome: Completed

## 2023-01-18 NOTE — PROGRESS NOTES
RT Inhaler-Nebulizer Bronchodilator Protocol Note    There is a bronchodilator order in the chart from a provider indicating to follow the RT Bronchodilator Protocol and there is an Initiate RT Inhaler-Nebulizer Bronchodilator Protocol order as well (see protocol at bottom of note). CXR Findings:  No results found. The findings from the last RT Protocol Assessment were as follows:   History Pulmonary Disease: Chronic pulmonary disease  Respiratory Pattern: Regular pattern and RR 12-20 bpm  Breath Sounds: Inspiratory and expiratory or bilateral wheezing and/or rhonchi  Cough: Strong, spontaneous, non-productive  Indication for Bronchodilator Therapy: Wheezing associated with pulm disorder  Bronchodilator Assessment Score: 8    Aerosolized bronchodilator medication orders have been revised according to the RT Inhaler-Nebulizer Bronchodilator Protocol below. Respiratory Therapist to perform RT Therapy Protocol Assessment initially then follow the protocol. Repeat RT Therapy Protocol Assessment PRN for score 0-3 or on second treatment, BID, and PRN for scores above 3. No Indications - adjust the frequency to every 6 hours PRN wheezing or bronchospasm, if no treatments needed after 48 hours then discontinue using Per Protocol order mode. If indication present, adjust the RT bronchodilator orders based on the Bronchodilator Assessment Score as indicated below. Use Inhaler orders unless patient has one or more of the following: on home nebulizer, not able to hold breath for 10 seconds, is not alert and oriented, cannot activate and use MDI correctly, or respiratory rate 25 breaths per minute or more, then use the equivalent nebulizer order(s) with same Frequency and PRN reasons based on the score. If a patient is on this medication at home then do not decrease Frequency below that used at home.     0-3 - enter or revise RT bronchodilator order(s) to equivalent RT Bronchodilator order with Frequency of every 4 hours PRN for wheezing or increased work of breathing using Per Protocol order mode. 4-6 - enter or revise RT Bronchodilator order(s) to two equivalent RT bronchodilator orders with one order with BID Frequency and one order with Frequency of every 4 hours PRN wheezing or increased work of breathing using Per Protocol order mode. 7-10 - enter or revise RT Bronchodilator order(s) to two equivalent RT bronchodilator orders with one order with TID Frequency and one order with Frequency of every 4 hours PRN wheezing or increased work of breathing using Per Protocol order mode. 11-13 - enter or revise RT Bronchodilator order(s) to one equivalent RT bronchodilator order with QID Frequency and an Albuterol order with Frequency of every 4 hours PRN wheezing or increased work of breathing using Per Protocol order mode. Greater than 13 - enter or revise RT Bronchodilator order(s) to one equivalent RT bronchodilator order with every 4 hours Frequency and an Albuterol order with Frequency of every 2 hours PRN wheezing or increased work of breathing using Per Protocol order mode.      Electronically signed by George Turner RCP on 1/17/2023 at 7:24 PM

## 2023-01-18 NOTE — PROGRESS NOTES
Shift assessment complete; see flow sheet. O2 4 LPM nc in place. Pt denies pain at this time. Pt denies any needs at this time.  Pt educated on use of call light and to call out with needs, verbalized understanding, bed in low locked position for pt safety

## 2023-01-18 NOTE — DISCHARGE INSTR - COC
Continuity of Care Form    Patient Name: Torsten Marrero   :  1940  MRN:  [id number]    Admit date:  2023  Discharge date:  2023    Code Status Order: Full Code   Advance Directives:     Admitting Physician:  Ulysses Bally, MD  PCP: No primary care provider on file. Discharging Nurse: MEERA HUGHES [facility] Unit/Room#: 0212/0212-01  Discharging Unit Phone Number: [phone]    Emergency Contact:   Extended Emergency Contact Information  Primary Emergency Contact: Richie Hernandez  Work Phone: [phone]  Mobile Phone: [phone]  Relation: Child   needed?  No    Past Surgical History:  Past Surgical History:   Procedure Laterality Date    CARDIAC SURGERY      cardiac cath    HYSTERECTOMY (CERVIX STATUS UNKNOWN)         Immunization History:   Immunization History   Administered Date(s) Administered    COVID-19, PFIZER PURPLE top, DILUTE for use, (age 15 y+), 30mcg/0.3mL 2021, 2021, 10/05/2021       Active Problems:  Patient Active Problem List   Diagnosis Code    Acute hypoxemic respiratory failure (HCC) J96.01    Pneumonia, unspecified organism J18.9    Cerebrovascular accident (CVA) ([address] I63.9    Coronary artery disease involving native coronary artery of native heart without angina pectoris I25.10    COPD with acute exacerbation ([address] J44.1    Hyponatremia E87.1    Influenza A J10.1       Isolation/Infection:   Isolation            Droplet          Patient Infection Status       None to display            Nurse Assessment:  Last Vital Signs: /63   Pulse 85   Temp 97.7 °F (36.5 °C) (Oral)   Resp 16   Ht 5' 7\" (1.702 m)   Wt 157 lb (71.2 kg)   SpO2 99%   BMI 24.59 kg/m²     Last documented pain score (0-10 scale):    Last Weight:   Wt Readings from Last 1 Encounters:   23 157 lb (71.2 kg)     Mental Status:  oriented and alert    IV Access:  - None    Nursing Mobility/ADLs:  Walking   Independent  Transfer  Independent  Bathing  Assisted  Dressing [facility] Delivery   whole    Wound Care Documentation and Therapy:        Elimination:  Continence: Bowel: Yes  Bladder: Yes  Urinary Catheter: None   Colostomy/Ileostomy/Ileal Conduit: No       Date of Last BM: 1/17/23    Intake/Output Summary (Last 24 hours) at 1/18/2023 1410  Last data filed at 1/18/2023 0730  Gross per 24 hour   Intake 10 ml   Output 300 ml   Net -290 ml     I/O last 3 completed shifts: In: 10 [I.V.:10]  Out: -     Safety Concerns:     History of Falls (last 30 days)    Impairments/Disabilities:      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   Ventilator:    - No ventilator support    Rehab Therapies: Physical Therapy and Occupational Therapy  Weight Bearing Status/Restrictions: No weight bearing restrictions  Other Medical Equipment (for information only, NOT a DME order):  braces left leg  Other Treatments:     Patient's personal belongings (please select all that are sent with patient):      RN SIGNATURE:  Electronically signed by Sudha Marte RN on 1/18/23 at 2:28 PM EST    CASE MANAGEMENT/SOCIAL WORK SECTION    Inpatient Status Date: 1/13/2023    Readmission Risk Assessment Score:  Readmission Risk              Risk of Unplanned Readmission:  16           Discharging to Facility/ Agency   [facility]  [phone]  Fax:    Dialysis Facility (if applicable)   Name:  Address:  Dialysis Schedule:  Phone:  Fax:    / signature: Electronically signed by Lia Wall RN on 1/18/23 at 2:10 PM EST    PHYSICIAN SECTION    Prognosis: Good    Condition at Discharge: Stable    Rehab Potential (if transferring to Rehab):     Recommended Labs or Other Treatments After Discharge:     Physician Certification: I certify the above information and transfer of Kamilah Dacosta  is necessary for the continuing treatment of the diagnosis listed and that she requires Home Care for less 30 days.      Update Admission H&P: No change in H&P    PHYSICIAN SIGNATURE:  CORKY Mendez MD/Electronically signed by Elizbeth Pallas, RN on 1/18/23 at 2:10 PM EST

## 2023-01-18 NOTE — PROGRESS NOTES
Progress Note    Admit Date:  1/13/2023    Admitted with influenza A and acute hypoxic respiratory failure    Subjective:  Ms. Yobany Adams seen . Cough and SOB have improved. Patient feels much better today, sitting up on the chair. Still requiring oxygen 4 L at rest and 5 L with activity. She was initially on 7 L. She is hoping to go home soon. .  Discussed with patient -we need to wean her oxygen further closer to 2 L to set her up with home oxygen on discharge. Patient was not on home oxygen prior to admission. Objective:   Patient Vitals for the past 4 hrs:   BP Temp Temp src Pulse Resp SpO2   01/18/23 0845 138/63 97.7 °F (36.5 °C) Oral 85 16 99 %   01/18/23 0742 -- -- -- -- -- 95 %            Intake/Output Summary (Last 24 hours) at 1/18/2023 1025  Last data filed at 1/18/2023 0730  Gross per 24 hour   Intake 10 ml   Output 300 ml   Net -290 ml         Physical Exam:  Gen: No distress. Alert. Awake and well-oriented  Eyes: PERRL. No sclera icterus. No conjunctival injection. Neck: No JVD. Trachea midline. Resp: No accessory muscle use. No crackles. No wheezes. + Mild rhonchi. CV: Regular rate. Regular rhythm. No murmur. No rub. No edema. Peripheral Pulses: +2 palpable, equal bilaterally   GI: Non-tender. Non-distended. Normal bowel sounds. Skin: Warm and dry. No nodule on exposed extremities. No rash on exposed extremities. M/S: No cyanosis. No joint deformity. No clubbing. Neuro: Awake. Grossly nonfocal    Psych: Oriented x 3. No anxiety or agitation.        Scheduled Meds:   azithromycin  250 mg Oral Daily    predniSONE  40 mg Oral Daily    ipratropium-albuterol  1 ampule Inhalation TID    sodium chloride flush  5-40 mL IntraVENous 2 times per day    enoxaparin  40 mg SubCUTAneous Daily    aspirin  325 mg Oral Daily    atorvastatin  20 mg Oral Nightly    cilostazol  50 mg Oral BID    ezetimibe  10 mg Oral Daily    folic acid  1 mg Oral Daily    [Held by provider] hydroCHLOROthiazide  12.5 mg Oral Daily    levothyroxine  50 mcg Oral Daily    losartan  100 mg Oral Daily    metoprolol succinate  100 mg Oral Daily    oxybutynin  10 mg Oral Daily       Continuous Infusions:   sodium chloride Stopped (01/13/23 1913)       PRN Meds:  sodium chloride flush, sodium chloride, ondansetron **OR** ondansetron, polyethylene glycol, acetaminophen **OR** acetaminophen, potassium chloride **OR** potassium alternative oral replacement **OR** potassium chloride, magnesium sulfate, albuterol sulfate HFA      Data:  CBC:   No results for input(s): WBC, HGB, HCT, MCV, PLT in the last 72 hours. BMP:   Recent Labs     01/16/23  0529   *   K 3.7   CL 95*   CO2 26   BUN 15   CREATININE 0.8         CULTURES  Covid not detected  Influenza A detected  Legionella not detected  Strep pneumoniae not detected      RADIOLOGY  XR CHEST PORTABLE   Final Result   1. No acute abnormality. CTA PULMONARY W CONTRAST   Final Result   1. No evidence of acute pulmonary embolism or acute aortic disease. No   evidence of aortic aneurysm or dissection. 2. Heart, pericardium and mediastinum appear stable. No evidence of   lymphadenopathy. 3. Moderate emphysema. Bilateral lower lobe atelectatic changes but no   consolidative infiltrates. Increased interstitial markings may indicate mild   interstitial edema. 4. Incidental cholelithiasis without inflammation of the bile duct. 5. Incidental bilateral adrenal nodules. XR CHEST PORTABLE   Final Result   Mild bibasilar airspace opacities favored to represent atelectasis.              Assessment/Plan:  #Acute respiratory failure with hypoxia  #COPD AE  #Influenza A  -no home O2 at baseline  -requiring up to 7L --> 3L-> 4L at rest and 5 L with activity;  continue to wean  -CTA shows no PE, no pneumonia, significant for emphysema, mild interstitial edema  -procal negative  -legionella and strep negative   -continue IBD  -prednisone taper -tamiflu  -zithromax and rocephin --> zithromax po  -droplet isolation   -lasix 20 mg IV x 1 given  -pulmonology following   -Encouraged Acapella    #Hyponatremia  -1500 ml FR   -continue      #Fall x2   #Generalized weakness  -no acute injury other than bruise on right buttock  -PT/OT     #Hx of CVA with residual left sided weakness  -supportive care  -uses rollator at home and hemiwalker in public at baseline  -continue ASA and statin     #Hypokalemia   -replace     #Thrombocytopenia  -mild  -monitor on lovenox     #CAD  #PVD  #Bilateral carotid artery stenosis  -continue pletal, ASA and statin     #HTN   -BP elevated  -continue home toprol xl, cozaar and hctz     #HLD  -continue statin and zetia     #Hypothyroidism  -continue synthroid     #OAB  -continue oxybutynin     DVT Prophylaxis: Lovenox  Diet: ADULT DIET;  Regular; 1500 ml  Code Status: Full Code    DC plans for home with home O2  Discussed with patient face-to-face need for home O2    Martita Torres MD  1/18/2023  10:25 AM

## 2023-01-18 NOTE — PROGRESS NOTES
Inpatient Physical Therapy Daily Treatment Note    Unit: 2 [place]  Date:  1/18/2023  Patient Name:    Kamilah Dacosta  Admitting diagnosis:  Acute hypoxemic respiratory failure ([facility] [address] [J96.01]  Pneumonia, unspecified organism [J18.9]  Admit Date:  1/13/2023    Precautions/Restrictions/WB Status/ Lines/ Wounds/ Oxygen: Fall risk, Bed/chair alarm, Lines -IV, Supplemental O2 (7 L), and Purewick catheter, Telemetry, Continuous pulse oximetry, and Isolation Precautions: Droplet, L AFO and sneakers with ambulation      Discharge Recommendations:  Home with initial 24/7 assist and home PT  DME needs for discharge:  RW       Therapy recommendation for EMS Transport: NA    Therapy recommendations for staff:   Assist of 1 with use of rolling walker (RW) and gait belt for all transfers and ambulation to/from chair  to/from bathroom  within room    History Of Present Illness:     per H&P:  \"The patient is a 78 y.o. female with CAD, PVD, bilateral carotid artery stenosis, HTN, HLD, OAB and hx of CVA with residual left sided weakness who presents [facility] as transfer from [facility] ED with complaint of fall x2. Patient states that for the last 2 weeks she has experienced worsening cough and CASTANEDA. She states she has had pneumonia before and thought that's what it was. She lives alone and fell twice this morning. She states her legs became very weak and she fell to her butt. She called her daughter who took her to her PCP and PCP recommended ED evaluation. Patient has bruise to left buttock but otherwise denies injury or pain from her fall. Denies lightheadedness, dizziness, LOC and did not hit her head. She is not on AC. Denies fevers, chills, chest pain, palpitations, abd pain, n/v/c/d, numbness, tingling or swelling of her extremities. Discussed CODE STATUS with the patient who wishes to remain a FULL CODE. \"   Past Medical History:    Past Medical History []Expand by Default               Diagnosis Date    CAD (coronary artery disease)      Cerebral artery occlusion with cerebral infarction ([facility] [address]      Hyperlipidemia      Hypertension           Chest CT:       Impression:         1. No evidence of acute pulmonary embolism or acute aortic disease. No   evidence of aortic aneurysm or dissection. 2. Heart, pericardium and mediastinum appear stable. No evidence of   lymphadenopathy. 3. Moderate emphysema. Bilateral lower lobe atelectatic changes but no   consolidative infiltrates. Increased interstitial markings may indicate mild   interstitial edema. 4. Incidental cholelithiasis without inflammation of the bile duct. 5. Incidental bilateral adrenal nodules. Home Health S4 Level Recommendation: Level 1 Standard  AM-PAC Mobility Score   AM-PAC Inpatient Mobility Raw Score : 21       Treatment Time:  12:20-12:45  Treatment number: 3  Timed Code Treatment Minutes: 25 minutes  Total Treatment Minutes:  25  minutes    Cognition    A&O orientation not directly assessed. Able to follow 2 step commands    Subjective  Patient sitting up in chair with no family present   Pt agreeable to this PT tx. Pain   none reported    Bed Mobility   Supine to Sit:    Not Tested  Sit to Supine:   Not Tested  Rolling:   Not Tested  Scooting:   Independent    Transfer Training     Sit to stand:   SBA  Stand to sit:   SBA  Bed to Chair:   Not Tested with use of N/A    Gait Training gait completed as indicated below  Distance:      35 ft  Deviations (firm surface/linoleum):  decreased akua, narrow ADRY, forward flexed posture, step to pattern, and decreased step length bilaterally  Assistive Device Used:    gait belt and rolling walker (RW)  Level of Assist:    SBA  Comment: fair balance, good safety and sequencing.  PAient was able to manage oxygen tubing independently    Stair Training deferred, pt does not have stairs in the home environment        Balance  Sitting:  Normal; Independent  Comments:     Standing: Good ; SBA  Comments: static,using RW    Patient Education      Role of PT, POC, Discharge recommendations, DC recommendations, safety awareness, transfer techniques, and calling for assist with mobility. Positioning Needs       Pt up in chair, alarm set, positioned in proper neutral alignment and pressure relief provided. Call light provided and all needs within reach    Activity Tolerance   Pt completed therapy session with No adverse symptoms noted w/activity. Other  CM requested this writer re-assess patient prior to dc, patient requesting RW for use at [facility]. Patient has been using RW here, and has been using HW in home PTA. Patient thought she may have received rollater ~ 5 years ago. Discussed that insurance may not pay for this RW if it is not within the correct time frame. Patient reports that she will pay 100% of cost if insurance declines to pay for RW. RW adjusted, paperwork completed at request of CM. Discussed recommendation for home care services initally at [facility]. Patient will consider. Assessment :  Patient demonstrated good progress this date. Recommending Home home with initial 24/7 assist and home PT upon discharge as patient functioning would benefit from continued therapy services    Goals : To be met in 3 visits:  1). Independent with LE Ex x 10 reps  2.) Bed to chair: Independent     To be met in 6 visits:  1). Supine to/from sit: Independent  2). Sit to/from stand: Independent  3). Bed to chair: Independent  4). Gait: Ambulate 150 ft.  with  SBA and use of rolling walker (RW)  5). Tolerate B LE exercises 3 sets of 10-15 reps  Plan   Continue with plan of care. Signature: Nydia Ashford, PT #[id number]     If patient discharges from this facility prior to next visit, this note will serve as the Discharge Summary.

## 2023-01-18 NOTE — PROGRESS NOTES
O2 Sat at rest on room air is 93%. O2 Sat with activity on room air is 88%. O2 Sat at rest with oxygen @  3 lpm is 97%. O2 Sat with activity with oxygen @ 3 lpm is 94%.

## 2023-01-18 NOTE — DISCHARGE INSTRUCTIONS
Your information:  Name: Maren Ojeda  : 1940    Your instructions: Follow instructions below. What to do after you leave the hospital:    Recommended diet: regular diet and 1500 ml fluid restriction    Recommended activity: activity as tolerated        The following personal items were collected during your admission and were returned to you:    Belongings  Dental Appliances: Uppers, Lowers  Vision - Corrective Lenses: None  Hearing Aid: None  Clothing: Footwear, Socks, Sweater, Pants  Jewelry: None  Body Piercings Removed: No  Electronic Devices: Cell Phone  Weapons (Notify Protective Services/Security): None  Other Valuables: Other (Comment)  Home Medications: None  Valuables Given To: Patient  Provide Name(s) of Who Valuable(s) Were Given To: Patient  Responsible person(s) in the waiting room: N/a  Patient approves for provider to speak to responsible person post operatively: Yes    Information obtained by:  By signing below, I understand that if any problems occur once I leave the hospital I am to contact MD.  I understand and acknowledge receipt of the instructions indicated above.

## 2023-01-18 NOTE — PROGRESS NOTES
Handoff report and transfer of care given at bedside to [facility]. Patient in stable condition, denies needs/concerns at this time. Call light within reach.

## 2023-01-18 NOTE — PROGRESS NOTES
Pulmonary Progress Note  CC: COPD, flu    Subjective:  Pt would like to go home. Still hypoxic. EXAM: /75   Pulse 60   Temp 98.9 °F (37.2 °C)   Resp 16   Ht 5' 7\" (1.702 m)   Wt 157 lb (71.2 kg)   SpO2 95%   BMI 24.59 kg/m²  on 3L  Constitutional:  No acute distress. Eyes: PERRL. Conjunctivae anicteric. ENT: Normal nose. Normal tongue. Neck:  Trachea is midline. No thyroid tenderness. Respiratory: No accessory muscle usage. No wheezes. No rales. + Rhonchi. Cardiovascular: Normal S1S2. No digit clubbing. No digit cyanosis. No LE edema. Psychiatric: No anxiety or Agitation. Alert and Oriented to person, place and time. Scheduled Meds:   azithromycin  250 mg Oral Daily    predniSONE  40 mg Oral Daily    ipratropium-albuterol  1 ampule Inhalation TID    sodium chloride flush  5-40 mL IntraVENous 2 times per day    enoxaparin  40 mg SubCUTAneous Daily    aspirin  325 mg Oral Daily    atorvastatin  20 mg Oral Nightly    cilostazol  50 mg Oral BID    ezetimibe  10 mg Oral Daily    folic acid  1 mg Oral Daily    [Held by provider] hydroCHLOROthiazide  12.5 mg Oral Daily    levothyroxine  50 mcg Oral Daily    losartan  100 mg Oral Daily    metoprolol succinate  100 mg Oral Daily    oxybutynin  10 mg Oral Daily     Continuous Infusions:   sodium chloride Stopped (01/13/23 1913)     PRN Meds:  sodium chloride flush, sodium chloride, ondansetron **OR** ondansetron, polyethylene glycol, acetaminophen **OR** acetaminophen, potassium chloride **OR** potassium alternative oral replacement **OR** potassium chloride, magnesium sulfate, albuterol sulfate HFA    Labs:  CBC:   No results for input(s): WBC, HGB, HCT, MCV, PLT in the last 72 hours. BMP:   Recent Labs     01/16/23  0529   *   K 3.7   CL 95*   CO2 26   BUN 15   CREATININE 0.8       CTPA 1/13/23:   1. No evidence of acute pulmonary embolism or acute aortic disease. No   evidence of aortic aneurysm or dissection.    2. Heart, pericardium and mediastinum appear stable. No evidence of   lymphadenopathy. 3. Moderate emphysema. Bilateral lower lobe atelectatic changes but no   consolidative infiltrates. Increased interstitial markings may indicate mild   interstitial edema. 4. Incidental cholelithiasis without inflammation of the bile duct. 5. Incidental bilateral adrenal nodules. CXR 1/15/23: hyperexpanded lungs    ASSESSMENT:  Acute hypoxic respiratory failure   Influenza A   Pulmonary emphysema noted on Chest CT     PLAN:  Supplemental oxygen to maintain SaO2 >92%; wean as tolerated  Intensive inhaled bronchodilator therapy.   Prednisone taper  Azithromycin completed  Tamiflu  Outpatient PFTs  Will need home O2